# Patient Record
Sex: FEMALE | Race: WHITE | NOT HISPANIC OR LATINO | URBAN - METROPOLITAN AREA
[De-identification: names, ages, dates, MRNs, and addresses within clinical notes are randomized per-mention and may not be internally consistent; named-entity substitution may affect disease eponyms.]

---

## 2017-01-16 ENCOUNTER — TRANSFERRED RECORDS (OUTPATIENT)
Dept: FAMILY MEDICINE | Facility: CLINIC | Age: 37
End: 2017-01-16

## 2017-01-16 ENCOUNTER — OFFICE VISIT (OUTPATIENT)
Dept: FAMILY MEDICINE | Facility: CLINIC | Age: 37
End: 2017-01-16

## 2017-01-16 VITALS
WEIGHT: 200 LBS | DIASTOLIC BLOOD PRESSURE: 76 MMHG | SYSTOLIC BLOOD PRESSURE: 116 MMHG | BODY MASS INDEX: 35.44 KG/M2 | TEMPERATURE: 98.7 F | HEIGHT: 63 IN | HEART RATE: 100 BPM | OXYGEN SATURATION: 98 %

## 2017-01-16 DIAGNOSIS — M77.01 MEDIAL EPICONDYLITIS OF RIGHT ELBOW: Primary | ICD-10-CM

## 2017-01-16 DIAGNOSIS — Z23 NEED FOR DIPHTHERIA-TETANUS-PERTUSSIS (TDAP) VACCINE, ADULT/ADOLESCENT: ICD-10-CM

## 2017-01-16 DIAGNOSIS — L68.0 HIRSUTISM: ICD-10-CM

## 2017-01-16 DIAGNOSIS — E66.09 NON MORBID OBESITY DUE TO EXCESS CALORIES: ICD-10-CM

## 2017-01-16 DIAGNOSIS — Z00.00 ROUTINE GENERAL MEDICAL EXAMINATION AT A HEALTH CARE FACILITY: ICD-10-CM

## 2017-01-16 PROCEDURE — 36415 COLL VENOUS BLD VENIPUNCTURE: CPT | Performed by: FAMILY MEDICINE

## 2017-01-16 PROCEDURE — 99395 PREV VISIT EST AGE 18-39: CPT | Performed by: FAMILY MEDICINE

## 2017-01-16 PROCEDURE — 99214 OFFICE O/P EST MOD 30 MIN: CPT | Mod: 25 | Performed by: FAMILY MEDICINE

## 2017-01-16 NOTE — PROGRESS NOTES
SUBJECTIVE:     CC: Jackeline Yeboah is an 36 year old woman who presents for preventive health visit.     Healthy Habits:    Do you get at least three servings of calcium containing foods daily (dairy, green leafy vegetables, etc.)? yes    Amount of exercise or daily activities, outside of work: pt bikes everywhere, so regular daily exercise    Problems taking medications regularly No    Medication side effects: No    Have you had an eye exam in the past two years? yes    Do you see a dentist twice per year? Once per year    Do you have sleep apnea, excessive snoring or daytime drowsiness?yes, has CPAP        Pt still has ongoing chronic cough but better since on Singulair. She has since stopped her flovent because it made her throat burn. She needs to try another inhaled steroid.     She complains of right elbow pain. It gets better if she avoids any weight bearing activity.     Today's PHQ-2 Score:   PHQ-2 ( 1999 Pfizer) 1/16/2017 6/30/2015   Q1: Little interest or pleasure in doing things 2 2   Q2: Feeling down, depressed or hopeless 2 2   PHQ-2 Score 4 4     Pt sees therapist to deal with her depression/meds    Abuse: Current or Past(Physical, Sexual or Emotional)- No  Do you feel safe in your environment - Yes    Social History   Substance Use Topics     Smoking status: Never Smoker      Smokeless tobacco: Never Used     Alcohol Use: No     The pt does not drink alcohol    Recent Labs   Lab Test  06/30/15   1422   CHOL  149   HDL  48   LDL  88   TRIG  66       Reviewed orders with patient.  Reviewed health maintenance and updated orders accordingly - Yes    Mammo Decision Support:  Mammogram not appropriate for this patient based on age.    Pertinent mammograms are reviewed under the imaging tab.  History of abnormal Pap smear: NO - age 30- 65 PAP every 3 years recommended  All Histories reviewed and updated in Epic.  History reviewed. No pertinent past medical history.   Past Surgical History   Procedure  Laterality Date     C lap ovarian cystotomy  2007     Obstetric History     No data available          ROS:  C: NEGATIVE for fever, chills, change in weight  I: NEGATIVE for worrisome rashes, moles or lesions  E: NEGATIVE for vision changes or irritation  ENT: allergy related congestion, better since starting loratadine  RESP:cough, see above  B: NEGATIVE for masses, tenderness or discharge  CV: NEGATIVE for chest pain, palpitations or peripheral edema  GI: NEGATIVE for nausea, abdominal pain, heartburn, or change in bowel habits  : NEGATIVE for unusual urinary or vaginal symptoms. Periods are regular.  M: NEGATIVE for significant arthralgias or myalgia  N: NEGATIVE for weakness, dizziness or paresthesias  P: NEGATIVE for changes in mood or affect    Problem list, Medication list, Allergies, and Medical/Social/Surgical histories reviewed in [facility] and updated as appropriate.  Labs reviewed in EPIC  BP Readings from Last 3 Encounters:   01/16/17 116/76   12/30/16 108/70   12/05/16 102/70    Wt Readings from Last 3 Encounters:   01/16/17 90.719 kg (200 lb)   12/30/16 91.627 kg (202 lb)   12/05/16 89.812 kg (198 lb)                  Patient Active Problem List   Diagnosis     Anxiety     Depression     Obstructive sleep apnea syndrome     Past Surgical History   Procedure Laterality Date     C lap ovarian cystotomy  2007       Social History   Substance Use Topics     Smoking status: Never Smoker      Smokeless tobacco: Never Used     Alcohol Use: No     Family History   Problem Relation Age of Onset     Hypertension Mother      Hypertension Father      Hyperlipidemia Mother      Hyperlipidemia Father      DIABETES Mother 40     Prostate Cancer Maternal Grandmother 50     lukemia     Prostate Cancer Paternal Grandmother 65     stomack Cancer     Depression Mother      Asthma Mother      Asthma Father      Asthma Brother      Obesity Mother 40         Current Outpatient Prescriptions   Medication Sig Dispense Refill      "montelukast (SINGULAIR) 10 MG tablet Take 1 tablet (10 mg) by mouth At Bedtime 30 tablet 1     guaiFENesin-codeine (ROBITUSSIN AC) 100-10 MG/5ML SOLN solution Take 5-10 mLs by mouth every 4 hours as needed for cough 180 mL 0     loratadine (CLARITIN) 10 MG tablet Take 10 mg by mouth daily       IBUPROFEN PO Take 200 mg by mouth as needed for moderate pain or fever       ACETAMINOPHEN PO Take 325 mg by mouth as needed for pain or fever       albuterol (PROAIR HFA, PROVENTIL HFA, VENTOLIN HFA) 108 (90 BASE) MCG/ACT inhaler Inhale 2 puffs into the lungs every 6 hours 1 Inhaler 2     Allergies   Allergen Reactions     Apple Shortness Of Breath     Red apples     Corn Syrup [Glucose] Shortness Of Breath     Grass Shortness Of Breath     No Clinical Screening - See Comments Rash     Sun - skin reaction     Dust Mite Extract Difficulty breathing     Milk Protein Extract Other (See Comments) and GI Disturbance     Joint pain     Trees Difficulty breathing     Dogs Rash     Latex Rash     Peanuts [Nuts] Rash     OBJECTIVE:     /76 mmHg  Pulse 100  Temp(Src) 98.7  F (37.1  C) (Oral)  Ht 1.6 m (5' 3\")  Wt 90.719 kg (200 lb)  BMI 35.44 kg/m2  SpO2 98%  LMP 01/03/2017  EXAM:  GENERAL: healthy, alert and no distress  EYES: Eyes grossly normal to inspection, PERRL and conjunctivae and sclerae normal  HENT: ear canals and TM's normal, nose and mouth without ulcers or lesions  NECK: no adenopathy, no asymmetry, masses, or scars and thyroid normal to palpation  RESP: lungs clear to auscultation - no rales, rhonchi or wheezes  BREAST: normal without masses, tenderness or nipple discharge and no palpable axillary masses or adenopathy  CV: regular rate and rhythm, normal S1 S2, no S3 or S4, no murmur, click or rub, no peripheral edema and peripheral pulses strong  ABDOMEN: soft, nontender, no hepatosplenomegaly, no masses and bowel sounds normal   (female): normal female external genitalia, normal urethral meatus, vaginal " "mucosa pink, moist, well rugated, and normal cervix/adnexa/uterus without masses or discharge  MS: no gross musculoskeletal defects noted, no edema  SKIN: extreme hirsute hair growth throughout the body with Ferriman-Gallwey score of 28  NEURO: Normal strength and tone, mentation intact and speech normal  PSYCH: mentation appears normal, affect normal/bright    ASSESSMENT/PLAN:     Jackeline was seen today for physical and cough.    Diagnoses and all orders for this visit:    Medial epicondylitis of right elbow  Recommend RICE and forearm band is given.    Routine general medical examination at a health care facility  -     BSE reviewed, pap will be done next year, encouraged regular exercise.    Hirsutism  -     Testosterone Free and Total ([facility])  -     Dihydrotestosterone ([facility])  -     TSH ([facility])  -     Thyroxine (T4) Free  Direct  S ([facility])  -     Prolactin ([facility])  -     17-OH Progesterone  2 Spec ([facility])  -     VENOUS COLLECTION  Refer to Endocrinology for treatment options.    Non morbid obesity due to excess calories  -     Lipid Panel ([facility])  -     Hemoglobin A1C ([facility])  -     Comp. Metabolic Panel (14) ([facility])  -     VENOUS COLLECTION    Need for diphtheria-tetanus-pertussis (Tdap) vaccine, adult/adolescent  -     Cancel: TDAP (ADACEL AGES 11-64)  -     VENOUS COLLECTION        COUNSELING:   Reviewed preventive health counseling, as reflected in patient instructions       Regular exercise       Healthy diet/nutrition       Family planning       Safe sex practices/STD prevention         reports that she has never smoked. She has never used smokeless tobacco.    Estimated body mass index is 35.44 kg/(m^2) as calculated from the following:    Height as of this encounter: 1.6 m (5' 3\").    Weight as of this encounter: 90.719 kg (200 lb).   Weight management plan: Discussed healthy diet and exercise guidelines and patient will follow up in 12 months in clinic to re-evaluate.    Counseling " Resources:  ATP IV Guidelines  Pooled Cohorts Equation Calculator  Breast Cancer Risk Calculator  FRAX Risk Assessment  ICSI Preventive Guidelines  Dietary Guidelines for Americans, 2010  USDA's MyPlate  ASA Prophylaxis  Lung CA Screening    Crystal Hilton MD  [facility]

## 2017-01-16 NOTE — Clinical Note
[facility] [address] [place]  [address]  Phone: [phone]    January 20, 2017      Jackeline Yeboah  [address] [facility] [address]              Dear Jackeline,    I am covering for  while she is out of town.   Your included test results are within normal ranges. I do not suggest that we make any changes at this time.      Sincerely,     Caesar Gupta M.D. For Dr Hilton    Results for orders placed or performed in visit on 01/16/17   Hemoglobin A1C ([facility])   Result Value Ref Range    Hemoglobin A1C 5.6 4.8 - 5.6 %    Narrative    Performed at:  01 - [facility] [address], [place], [place]  [id number]  : Quang Rocha MD, Phone:  [phone]

## 2017-01-18 LAB — HBA1C MFR BLD: 5.6 % (ref 4.8–5.6)

## 2017-01-20 LAB
ALBUMIN SERPL-MCNC: 4.4 G/DL (ref 3.5–5.5)
ALBUMIN/GLOB SERPL: 1.7 {RATIO} (ref 1.1–2.5)
ALP SERPL-CCNC: 76 IU/L (ref 39–117)
ALT SERPL-CCNC: 20 IU/L (ref 0–32)
AST SERPL-CCNC: 13 IU/L (ref 0–40)
BILIRUB SERPL-MCNC: 0.3 MG/DL (ref 0–1.2)
BUN SERPL-MCNC: 12 MG/DL (ref 6–20)
BUN/CREATININE RATIO: 16 (ref 8–20)
CALCIUM SERPL-MCNC: 9.4 MG/DL (ref 8.7–10.2)
CHLORIDE SERPLBLD-SCNC: 100 MMOL/L (ref 96–106)
CHOLEST SERPL-MCNC: 159 MG/DL (ref 100–199)
CREAT SERPL-MCNC: 0.76 MG/DL (ref 0.57–1)
DIHYDROTESTOSTERONE: 7.5 NG/DL
EGFR IF AFRICN AM: 118 ML/MIN/1.73
EGFR IF NONAFRICN AM: 102 ML/MIN/1.73
FREE TESTOSTERONE(DIRECT): 2.5 PG/ML (ref 0–4.2)
GLOBULIN, TOTAL: 2.6 G/DL (ref 1.5–4.5)
GLUCOSE SERPL-MCNC: 91 MG/DL (ref 65–99)
HDLC SERPL-MCNC: 45 MG/DL
LDL/HDL RATIO: 2.2 RATIO UNITS (ref 0–3.2)
LDLC SERPL CALC-MCNC: 100 MG/DL (ref 0–99)
POTASSIUM SERPL-SCNC: 4.3 MMOL/L (ref 3.5–5.2)
PROLACTIN: 24.6 NG/ML (ref 4.8–23.3)
PROT SERPL-MCNC: 7 G/DL (ref 6–8.5)
SODIUM SERPL-SCNC: 138 MMOL/L (ref 134–144)
T4 FREE SERPL-MCNC: 1.37 NG/DL (ref 0.82–1.77)
TESTOST SERPL-MCNC: 31 NG/DL (ref 8–48)
TOTAL CO2: 23 MMOL/L (ref 18–28)
TRIGL SERPL-MCNC: 68 MG/DL (ref 0–149)
TSH BLD-ACNC: 4.4 UIU/ML (ref 0.45–4.5)
VLDLC SERPL CALC-MCNC: 14 MG/DL (ref 5–40)

## 2017-01-20 NOTE — PROGRESS NOTES
Quick Note:    Dear Jackeline,   I am covering for  while she is out of town. Your included test results are within normal ranges. I do not suggest that we make any changes at this time.    Caesar Gupta M.D.    ______

## 2017-01-20 NOTE — PROGRESS NOTES
Quick Note:    Results mailed to patient today  Rosa Elena Whitley MA   I am covering for  while she is out of town. Your included test results are within normal ranges. I do not suggest that we make any changes at this time.  ______

## 2017-01-25 ENCOUNTER — TELEPHONE (OUTPATIENT)
Dept: FAMILY MEDICINE | Facility: CLINIC | Age: 37
End: 2017-01-25

## 2017-01-25 DIAGNOSIS — R79.89 ELEVATED PROLACTIN LEVEL: Primary | ICD-10-CM

## 2017-01-25 DIAGNOSIS — L68.0 HIRSUTISM: ICD-10-CM

## 2017-01-25 NOTE — TELEPHONE ENCOUNTER
Called patient to discuss results. Patient had seen Dr. Hilton's Continuentt message so was aware and agrees to plan.   She will RTC of repeat Prolactin level 1/27/17 and we will send referral to [facility] for consult regarding Hirsutism. If second prolactin level elevated, understands will need MRI of brain and will plan to get this all done prior to endocrine consult - expect appt with endocrine to be a few weeks out.   Patient agrees and no further questions. Future order in [facility] for prolactin.   Referral, Dr. Hilton's last visit note, labs for past 2 years faxed to endocrine. Noted on referral/labs that second prolactin level will be done 1/27/17. We should fax this level to endocrine also at [phone].  Maite Palomino RN

## 2017-01-25 NOTE — TELEPHONE ENCOUNTER
----- Message from Crystal Hilton MD sent at 1/23/2017  3:37 PM CST -----  Prolactin is mildly increased. This should be repeated to verify if it is real. If it remains mildly or more elevated, we will need to do MRI of the brain to exclude pituitary tumor (typically benign). I would also like Jackeline to see endocrinology to guide further work up of her Hirsutism.

## 2017-01-27 DIAGNOSIS — R79.89 ELEVATED PROLACTIN LEVEL: ICD-10-CM

## 2017-01-27 DIAGNOSIS — L68.0 HIRSUTISM: ICD-10-CM

## 2017-01-27 LAB
Lab: 51 NG/DL
Lab: NORMAL
Lab: NORMAL NG/DL
TUBE ID #1: NORMAL

## 2017-01-27 PROCEDURE — 36415 COLL VENOUS BLD VENIPUNCTURE: CPT | Performed by: FAMILY MEDICINE

## 2017-01-28 LAB — PROLACTIN: 51.3 NG/ML (ref 4.8–23.3)

## 2017-01-30 ENCOUNTER — TELEPHONE (OUTPATIENT)
Dept: FAMILY MEDICINE | Facility: CLINIC | Age: 37
End: 2017-01-30

## 2017-01-30 DIAGNOSIS — R79.89 ELEVATED PROLACTIN LEVEL: Primary | ICD-10-CM

## 2017-01-30 NOTE — TELEPHONE ENCOUNTER
Called patient with lab-prolactin result.  Informed  her Prolactin is elevated and she needs MRI of brain to evaluate pituitary.  Order sent to [facility] to call patient to schedule.  Patient has appointment with sophie on Friday Feb 3rd.

## 2017-01-31 NOTE — PROGRESS NOTES
1/30/17 faxed 1/16/17, 12/30/16, 12/5/16, 1/28/16 and 6/30/15 office notes and 1/16/17 to 6/30/15 lab results to Endocrinology clinic of \[facility]", Atten: Reji @ [phone]    Kenton Downing,   [facility]  [phone]

## 2017-02-01 ENCOUNTER — TRANSFERRED RECORDS (OUTPATIENT)
Dept: FAMILY MEDICINE | Facility: CLINIC | Age: 37
End: 2017-02-01

## 2017-02-03 LAB — TSH SERPL-ACNC: 2.8 UIU/ML (ref 0.3–5)

## 2017-02-04 ENCOUNTER — TRANSFERRED RECORDS (OUTPATIENT)
Dept: FAMILY MEDICINE | Facility: CLINIC | Age: 37
End: 2017-02-04

## 2017-04-13 ENCOUNTER — TELEPHONE (OUTPATIENT)
Dept: FAMILY MEDICINE | Facility: CLINIC | Age: 37
End: 2017-04-13

## 2017-04-13 DIAGNOSIS — G47.33 OBSTRUCTIVE SLEEP APNEA SYNDROME: Primary | ICD-10-CM

## 2018-03-29 DIAGNOSIS — B35.4 TINEA CORPORIS: ICD-10-CM

## 2018-03-29 RX ORDER — NYSTATIN 100000 U/G
CREAM TOPICAL 3 TIMES DAILY
Qty: 30 G | Refills: 1 | OUTPATIENT
Start: 2018-03-29

## 2018-04-03 ENCOUNTER — TELEPHONE (OUTPATIENT)
Dept: FAMILY MEDICINE | Facility: CLINIC | Age: 38
End: 2018-04-03

## 2018-04-03 DIAGNOSIS — B35.4 TINEA CORPORIS: ICD-10-CM

## 2018-04-03 RX ORDER — NYSTATIN 100000 U/G
CREAM TOPICAL
Qty: 30 G | Refills: 0 | Status: SHIPPED | OUTPATIENT
Start: 2018-04-03 | End: 2021-01-14

## 2018-04-03 NOTE — TELEPHONE ENCOUNTER
----- Message from Crystal Hilton MD sent at 4/3/2018  3:08 PM CDT -----  Regarding: RE: rash - nystatin cream request.   Ok to refill Nystatin.  Thanks    ----- Message -----     From: Maite Palomino RN     Sent: 4/2/2018   1:49 PM       To: Crystal Hilton MD  Subject: rash - nystatin cream request.                   Dr. Hilton - patient requesting refill on nystatin cream got from Dr. Ashby 1/2016 for rash in her axillae -- tinea corporis.   She has cpx scheduled with you 4/25/18.   States she has had symptoms again x couple months - using OTC lotrimin for past several weeks and helps some but not resolving.   Describes as irritated bumpy red rash. Does not shave in this area due to the rash. Can't use antifungal powder because bothers her asthma.   Asks if you might order the nystatin cream for her to use and hopefully clear up before her 4/25 cpx. Or do you need to see her?  Maite

## 2018-04-03 NOTE — TELEPHONE ENCOUNTER
Patient informed Dr. Hilton approved rx for nystatin cream. Rx sent to pharmacy.  Maite Palomino RN

## 2018-04-25 ENCOUNTER — OFFICE VISIT (OUTPATIENT)
Dept: FAMILY MEDICINE | Facility: CLINIC | Age: 38
End: 2018-04-25

## 2018-04-25 VITALS
DIASTOLIC BLOOD PRESSURE: 76 MMHG | HEIGHT: 63 IN | SYSTOLIC BLOOD PRESSURE: 122 MMHG | HEART RATE: 94 BPM | BODY MASS INDEX: 34.02 KG/M2 | WEIGHT: 192 LBS

## 2018-04-25 DIAGNOSIS — E66.09 OBESITY DUE TO EXCESS CALORIES WITHOUT SERIOUS COMORBIDITY, UNSPECIFIED CLASSIFICATION: ICD-10-CM

## 2018-04-25 DIAGNOSIS — Z00.00 ROUTINE GENERAL MEDICAL EXAMINATION AT A HEALTH CARE FACILITY: Primary | ICD-10-CM

## 2018-04-25 DIAGNOSIS — E55.9 VITAMIN D DEFICIENCY: ICD-10-CM

## 2018-04-25 DIAGNOSIS — L68.0 HIRSUTISM: ICD-10-CM

## 2018-04-25 DIAGNOSIS — J45.20 MILD INTERMITTENT ASTHMA WITHOUT COMPLICATION: ICD-10-CM

## 2018-04-25 DIAGNOSIS — E22.1 HYPERPROLACTINEMIA (H): ICD-10-CM

## 2018-04-25 DIAGNOSIS — D35.2 PITUITARY ADENOMA (H): ICD-10-CM

## 2018-04-25 DIAGNOSIS — L73.9 FOLLICULITIS: ICD-10-CM

## 2018-04-25 PROCEDURE — G0439 PPPS, SUBSEQ VISIT: HCPCS | Performed by: FAMILY MEDICINE

## 2018-04-25 PROCEDURE — 99214 OFFICE O/P EST MOD 30 MIN: CPT | Mod: 25 | Performed by: FAMILY MEDICINE

## 2018-04-25 RX ORDER — ALBUTEROL SULFATE 90 UG/1
2 AEROSOL, METERED RESPIRATORY (INHALATION) EVERY 6 HOURS
Qty: 1 INHALER | Refills: 2 | Status: SHIPPED | OUTPATIENT
Start: 2018-04-25 | End: 2021-01-14

## 2018-04-25 RX ORDER — CEPHALEXIN 500 MG/1
500 CAPSULE ORAL 3 TIMES DAILY
Qty: 30 CAPSULE | Refills: 0 | Status: SHIPPED | OUTPATIENT
Start: 2018-04-25 | End: 2018-06-25

## 2018-04-25 NOTE — PROGRESS NOTES
SUBJECTIVE:   CC: Jacekline Yeboah is an 38 year old woman who presents for preventive health visit.     Issues to add on today:  1. She has swollen lymph nodes, especially in her armpits. She feels like she has a full body yeast infection. She notes that it is typically intermittent, but for the past few days has been really bad. It hurts due to the swelling. She has been using a Nystatin cream on the affected areas.  2. Pituitary adenoma/hyperpituitarism: referred to Endocrinology last year, supposed to follow up every 3 months for labs, only did once. She did not follow up with [facility] as recommended.  3. Asthma: stable with only prn use of albuterol.   4. Vitamin D deficiency: taking supplement, uncertain of dose, never had level rechecked.       Healthy Habits:    Do you get at least three servings of calcium containing foods daily (dairy, green leafy vegetables, etc.)? no, taking calcium and/or vitamin D supplement: no    Amount of exercise or daily activities, outside of work: n/a    Problems taking medications regularly No    Medication side effects: No    Have you had an eye exam in the past two years? no    Do you see a dentist twice per year? No not since 12    Do you have sleep apnea, excessive snoring or daytime drowsiness?yes      PROBLEMS TO ADD ON...yearly MRI per Endocrinology, due for labs, and Vitamin D deficiency    Today's PHQ-2 Score:   PHQ-2 ( 1999 Pfizer) 4/25/2018 1/16/2017   Q1: Little interest or pleasure in doing things 0 2   Q2: Feeling down, depressed or hopeless 0 2   PHQ-2 Score 0 4       Abuse: Current or Past(Physical, Sexual or Emotional)- No  Do you feel safe in your environment - Yes    Social History   Substance Use Topics     Smoking status: Never Smoker     Smokeless tobacco: Never Used     Alcohol use No     If you drink alcohol do you typically have >3 drinks per day or >7 drinks per week? No                     Reviewed orders with patient.  Reviewed health maintenance  and updated orders accordingly - Yes  Labs reviewed in EPIC  BP Readings from Last 3 Encounters:   04/25/18 122/76   01/16/17 116/76   12/30/16 108/70    Wt Readings from Last 3 Encounters:   04/25/18 87.1 kg (192 lb)   01/16/17 90.7 kg (200 lb)   12/30/16 91.6 kg (202 lb)                  Patient Active Problem List   Diagnosis     Anxiety     Depression     Obstructive sleep apnea syndrome     YONATAN on CPAP     Obesity due to excess calories without serious comorbidity, unspecified classification     Hirsutism     Pituitary adenoma (H)     Hyperprolactinemia (H)     Past Surgical History:   Procedure Laterality Date     C LAP OVARIAN CYSTOTOMY  2007       Social History   Substance Use Topics     Smoking status: Never Smoker     Smokeless tobacco: Never Used     Alcohol use No     Family History   Problem Relation Age of Onset     Hypertension Mother      Hypertension Father      Hyperlipidemia Mother      Hyperlipidemia Father      DIABETES Mother 40     Prostate Cancer Maternal Grandmother 50     lukemia     Prostate Cancer Paternal Grandmother 65     stomack Cancer     Depression Mother      Asthma Mother      Asthma Father      Asthma Brother      Obesity Mother 40         Current Outpatient Prescriptions   Medication Sig Dispense Refill     ACETAMINOPHEN PO Take 325 mg by mouth as needed for pain or fever       albuterol (PROAIR HFA/PROVENTIL HFA/VENTOLIN HFA) 108 (90 Base) MCG/ACT Inhaler Inhale 2 puffs into the lungs every 6 hours 1 Inhaler 2     cephALEXin (KEFLEX) 500 MG capsule Take 1 capsule (500 mg) by mouth 3 times daily 30 capsule 0     Elastic Bandages & Supports (BAND-IT FOREARM BAND) MISC Use on right forearm for the treatment of epicondylitis 1 each 0     IBUPROFEN PO Take 200 mg by mouth as needed for moderate pain or fever       loratadine (CLARITIN) 10 MG tablet Take 10 mg by mouth daily       nystatin (MYCOSTATIN) cream Apply topically to axillary rash TID prn. See MD if no resolution of rash.  30 g 0     Allergies   Allergen Reactions     Apple Shortness Of Breath     Red apples     Corn Syrup [Glucose] Shortness Of Breath     Grass Shortness Of Breath     No Clinical Screening - See Comments Rash     Sun - skin reaction     Dust Mite Extract Difficulty breathing     Milk Protein Extract Other (See Comments) and GI Disturbance     Joint pain     Trees Difficulty breathing     Dogs Rash     Latex Rash     Peanuts [Nuts] Rash     Recent Labs   Lab Test  04/25/18   1458  02/03/17   1502  01/16/17   1155  06/30/15   1422   A1C   --    --   5.6   --    LDL  75   --   100*  88   HDL  38*   --   45  48   TRIG  67   --   68  66   ALT   --    --   20  11   CR   --    --   0.76  0.74   POTASSIUM   --    --   4.3  4.2   TSH   --   2.80   --    --         Mammogram not appropriate for this patient based on age.    Pertinent mammograms are reviewed under the imaging tab.  History of abnormal Pap smear: NO - age 30- 65 PAP every 3 years recommended    Reviewed and updated as needed this visit by clinical staff  Tobacco  Allergies  Meds  Med Hx  Surg Hx  Fam Hx  Soc Hx        Reviewed and updated as needed this visit by Provider        History reviewed. No pertinent past medical history.   Past Surgical History:   Procedure Laterality Date     C LAP OVARIAN CYSTOTOMY  2007     Obstetric History     No data available          ROS: (except as noted above)  CONSTITUTIONAL: NEGATIVE for fever, chills, change in weight  INTEGUMENTARU/SKIN: NEGATIVE for worrisome rashes, moles or lesions  EYES: NEGATIVE for vision changes or irritation  ENT: NEGATIVE for ear, mouth and throat problems  RESP: NEGATIVE for significant cough or SOB  BREAST: NEGATIVE for masses, tenderness or discharge  CV: NEGATIVE for chest pain, palpitations or peripheral edema  GI: NEGATIVE for nausea, abdominal pain, heartburn, or change in bowel habits  : NEGATIVE for unusual urinary or vaginal symptoms. Periods are regular.  MUSCULOSKELETAL:  "NEGATIVE for significant arthralgias or myalgia  NEURO: NEGATIVE for weakness, dizziness or paresthesias  PSYCHIATRIC: NEGATIVE for changes in mood or affect    OBJECTIVE:   /76  Pulse 94  Ht 1.6 m (5' 3\")  Wt 87.1 kg (192 lb)  LMP 04/23/2018 (Exact Date)  Breastfeeding? No  BMI 34.01 kg/m2  EXAM:  GENERAL: healthy, alert and no distress  EYES: Eyes grossly normal to inspection, PERRL and conjunctivae and sclerae normal  HENT: ear canals and TM's normal, nose and mouth without ulcers or lesions  NECK: no adenopathy, no asymmetry, masses, or scars and thyroid normal to palpation  RESP: lungs clear to auscultation - no rales, rhonchi or wheezes  BREAST: normal without masses, tenderness or nipple discharge and no palpable axillary masses or adenopathy  CV: regular rate and rhythm, normal S1 S2, no S3 or S4, no murmur, click or rub, no peripheral edema and peripheral pulses strong  ABDOMEN: soft, nontender, no hepatosplenomegaly, no masses and bowel sounds normal   (female): normal female external genitalia, normal urethral meatus, vaginal mucosa pink, moist, well rugated, and normal cervix/adnexa/uterus without masses or discharge  MS: no gross musculoskeletal defects noted, no edema  SKIN: axillary follicle hypertrophy and areas of redness/inflammation, hirsiut hair growth seen on face, chest, abdomen, low back, thighs  NEURO: Normal strength and tone, mentation intact and speech normal  PSYCH: mentation appears normal, affect normal/bright    ASSESSMENT/PLAN:   Jackeline was seen today for physical and refill request. She is a healthy appearing 38 year old female with known medical problems. Td booster is appropriate, but missed at her visit today. She will return for this with her next blood draw. BSE reviewed. She will establish baseline mammogram next year.    Diagnoses and all orders for this visit:    Hyperprolactinemia (H)  -     Prolactin ([facility]), recheck today and follow up with " "endocrinology.    Mild intermittent Asthma without complication  -     albuterol (PROAIR HFA/PROVENTIL HFA/VENTOLIN HFA) 108 (90 Base) MCG/ACT Inhaler; Inhale 2 puffs into the lungs every 6 hours  Continue as needed, check expiration date to make sure always have fresh inhaler on hand.    Pituitary adenoma (H)/Hirsutism  -     Prolactin ([facility])  -     TSH ([facility])  -     Referral to [facility]  Referral to endocrinologist to manage this is given.    Obesity due to excess calories without serious comorbidity, unspecified classification  -     Glucose  Serum ([facility])  -     Lipid Panel ([facility])  Discussed lifestyle modification including exercise and proper healthy eating. Co-morbidities associated with obesity discussed.    Vitamin D deficiency  -     Vitamin D  25-Hydroxy ([facility])  Need to verify adequate replacement dose.    Folliculitis  -     cephALEXin (KEFLEX) 500 MG capsule; Take 1 capsule (500 mg) by mouth 3 times daily  Hot pack to any areas that worsen, or follow up if failure to resolve.      COUNSELING:   Reviewed preventive health counseling, as reflected in patient instructions       Regular exercise       Healthy diet/nutrition       Vision screening       Hearing screening       Contraception       Osteoporosis Prevention/Bone Health         reports that she has never smoked. She has never used smokeless tobacco.    Estimated body mass index is 34.01 kg/(m^2) as calculated from the following:    Height as of this encounter: 1.6 m (5' 3\").    Weight as of this encounter: 87.1 kg (192 lb).   Weight management plan: Discussed healthy diet and exercise guidelines and patient will follow up in 6 months in clinic to re-evaluate.    Counseling Resources:  ATP IV Guidelines  Pooled Cohorts Equation Calculator  Breast Cancer Risk Calculator  FRAX Risk Assessment  ICSI Preventive Guidelines  Dietary Guidelines for Americans, 2010  USDA's MyPlate  ASA Prophylaxis  Lung CA Screening    Crystal MAY" MD Yobani  [facility]

## 2018-04-25 NOTE — MR AVS SNAPSHOT
After Visit Summary   4/25/2018    Jackeline Yeboah    MRN: [id number]           Patient Information     Date Of Birth          1980        Visit Information        Provider Department      4/25/2018 1:00 PM Crystal Hilton MD [facility]        Today's Diagnoses     Routine general medical examination at a health care facility    -  1    Hyperprolactinemia (H)        Pituitary adenoma (H)        Hirsutism        Obesity due to excess calories without serious comorbidity, unspecified classification        Vitamin D deficiency        Folliculitis        Mild intermittent asthma without complication          Care Instructions      Preventive Health Recommendations  Female Ages 26 - 39  Yearly exam:   See your health care provider every year in order to    Review health changes.     Discuss preventive care.      Review your medicines if you your doctor has prescribed any.    Until age 30: Get a Pap test every three years (more often if you have had an abnormal result).    After age 30: Talk to your doctor about whether you should have a Pap test every 3 years or have a Pap test with HPV screening every 5 years.   You do not need a Pap test if your uterus was removed (hysterectomy) and you have not had cancer.  You should be tested each year for STDs (sexually transmitted diseases), if you're at risk.   Talk to your provider about how often to have your cholesterol checked.  If you are at risk for diabetes, you should have a diabetes test (fasting glucose).  Shots: Get a flu shot each year. Get a tetanus shot every 10 years.   Nutrition:     Eat at least 5 servings of fruits and vegetables each day.    Eat whole-grain bread, whole-wheat pasta and brown rice instead of white grains and rice.    Talk to your provider about Calcium and Vitamin D.     Lifestyle    Exercise at least 150 minutes a week (30 minutes a day, 5 days of the week). This will help you control your weight and  prevent disease.    Limit alcohol to one drink per day.    No smoking.     Wear sunscreen to prevent skin cancer.    See your dentist every six months for an exam and cleaning.            Follow-ups after your visit        Additional Services     [facility]       Referral to [facility]  Phone: [phone]  Fax: [phone]  Reason for referral: MRI Noah with and without contrast                  Your next 10 appointments already scheduled     Jun 25, 2018 12:30 PM CDT   SHORT with Crystal Hilton MD   [facility]    [address] [place] [address] [phone]              Who to contact     If you have questions or need follow up information about today's clinic visit or your schedule please contact [facility] directly at [phone].  Normal or non-critical lab and imaging results will be communicated to you by epicuriohart, letter or phone within 4 business days after the clinic has received the results. If you do not hear from us within 7 days, please contact the clinic through epicuriohart or phone. If you have a critical or abnormal lab result, we will notify you by phone as soon as possible.  Submit refill requests through [employer] or call your pharmacy and they will forward the refill request to us. Please allow 3 business days for your refill to be completed.          Additional Information About Your Visit        epicurioharWhoSay Information     [employer] gives you secure access to your electronic health record. If you see a primary care provider, you can also send messages to your care team and make appointments. If you have questions, please call your primary care clinic.  If you do not have a primary care provider, please call [phone] and they will assist you.        Care EveryWhere ID     This is your Care EveryWhere ID. This could be used by other organizations to access your [place] medical records  [id number]       "  Your Vitals Were     Pulse Height Last Period Breastfeeding? BMI (Body Mass Index)       94 1.6 m (5' 3\") 04/23/2018 (Exact Date) No 34.01 kg/m2        Blood Pressure from Last 3 Encounters:   04/25/18 122/76   01/16/17 116/76   12/30/16 108/70    Weight from Last 3 Encounters:   04/25/18 87.1 kg (192 lb)   01/16/17 90.7 kg (200 lb)   12/30/16 91.6 kg (202 lb)              We Performed the Following     Glucose  Serum ([facility])     Lipid Panel ([facility])     Prolactin ([facility])     Referral to [facility]     TSH ([facility])     Vitamin D  25-Hydroxy ([facility])          Today's Medication Changes          These changes are accurate as of 4/25/18 11:59 PM.  If you have any questions, ask your nurse or doctor.               Start taking these medicines.        Dose/Directions    cephALEXin 500 MG capsule   Commonly known as:  KEFLEX   Used for:  Folliculitis   Started by:  Crystal Hilton MD        Dose:  500 mg   Take 1 capsule (500 mg) by mouth 3 times daily   Quantity:  30 capsule   Refills:  0         Stop taking these medicines if you haven't already. Please contact your care team if you have questions.     montelukast 10 MG tablet   Commonly known as:  SINGULAIR   Stopped by:  Crystal Hilton MD                Where to get your medicines      These medications were sent to [facility] [address] [place] [address] & [address], [place] [address]     Phone:  [phone]     albuterol 108 (90 Base) MCG/ACT Inhaler    cephALEXin 500 MG capsule                Primary Care Provider Office Phone # Fax #    Crystal Hilton [phone] [address] [place] [address]        Equal Access to Services     TENZIN STEVENSON AH: Roro Cano, julius luqnani, qaellynta kaalta garcía. [facility] [phone].    ATENCIÓN: Si evelin ocampo " disposición servicios gratuitos de asistencia lingüística. Queta parson [phone].    We comply with applicable federal civil rights laws and [place] laws. We do not discriminate on the basis of race, color, national origin, age, disability, sex, sexual orientation, or gender identity.            Thank you!     Thank you for choosing [facility]  for your care. Our goal is always to provide you with excellent care. Hearing back from our patients is one way we can continue to improve our services. Please take a few minutes to complete the written survey that you may receive in the mail after your visit with us. Thank you!             Your Updated Medication List - Protect others around you: Learn how to safely use, store and throw away your medicines at www.disposemymeds.org.          This list is accurate as of 4/25/18 11:59 PM.  Always use your most recent med list.                   Brand Name Dispense Instructions for use Diagnosis    ACETAMINOPHEN PO      Take 325 mg by mouth as needed for pain or fever        albuterol 108 (90 Base) MCG/ACT Inhaler    PROAIR HFA/PROVENTIL HFA/VENTOLIN HFA    1 Inhaler    Inhale 2 puffs into the lungs every 6 hours    Mild intermittent asthma without complication       BAND-IT FOREARM BAND Misc     1 each    Use on right forearm for the treatment of epicondylitis    Medial epicondylitis of right elbow       cephALEXin 500 MG capsule    KEFLEX    30 capsule    Take 1 capsule (500 mg) by mouth 3 times daily    Folliculitis       IBUPROFEN PO      Take 200 mg by mouth as needed for moderate pain or fever        loratadine 10 MG tablet    CLARITIN     Take 10 mg by mouth daily        nystatin cream    MYCOSTATIN    30 g    Apply topically to axillary rash TID prn. See MD if no resolution of rash.    Tinea corporis

## 2018-04-26 LAB
CHOLEST SERPL-MCNC: 126 MG/DL (ref 100–199)
GLUCOSE SERPL-MCNC: 82 MG/DL (ref 65–99)
HDLC SERPL-MCNC: 38 MG/DL
LDL/HDL RATIO: 2 RATIO (ref 0–3.2)
LDLC SERPL CALC-MCNC: 75 MG/DL (ref 0–99)
PROLACTIN: 26.4 NG/ML (ref 4.8–23.3)
TRIGL SERPL-MCNC: 67 MG/DL (ref 0–149)
TSH BLD-ACNC: 3.09 UIU/ML (ref 0.45–4.5)
VITAMIN D, 25-HYDROXY: 38.9 NG/ML (ref 30–100)
VLDLC SERPL CALC-MCNC: 13 MG/DL (ref 5–40)

## 2018-04-26 ASSESSMENT — ASTHMA QUESTIONNAIRES: ACT_TOTALSCORE: 23

## 2018-05-10 ENCOUNTER — TELEPHONE (OUTPATIENT)
Dept: FAMILY MEDICINE | Facility: CLINIC | Age: 38
End: 2018-05-10

## 2018-05-10 DIAGNOSIS — B37.31 YEAST INFECTION OF THE VAGINA: Primary | ICD-10-CM

## 2018-05-10 RX ORDER — FLUCONAZOLE 150 MG/1
150 TABLET ORAL ONCE
Qty: 2 TABLET | Refills: 0 | Status: SHIPPED | OUTPATIENT
Start: 2018-05-10 | End: 2018-05-10

## 2018-05-10 NOTE — TELEPHONE ENCOUNTER
Patient called requesting medication for vaginal yeast infection.  States has gotten worse since taking Keflex.  Per Dr. Hilton prescription for Diflucan sent to pharmacy.  Patient advised to RTC if symptoms do not resolve.  Stephani Gayle

## 2018-05-24 DIAGNOSIS — B37.31 YEAST INFECTION OF THE VAGINA: ICD-10-CM

## 2018-05-24 RX ORDER — FLUCONAZOLE 150 MG/1
150 TABLET ORAL ONCE
Qty: 2 TABLET | Refills: 0 | OUTPATIENT
Start: 2018-05-24 | End: 2018-05-24

## 2018-05-25 ENCOUNTER — TELEPHONE (OUTPATIENT)
Dept: FAMILY MEDICINE | Facility: CLINIC | Age: 38
End: 2018-05-25

## 2018-05-25 NOTE — TELEPHONE ENCOUNTER
Took this at the beginning of the month after using Keflex, was told to come in if not improved. We really should prove that it is yeast and not bacterial vaginosis.

## 2018-06-05 NOTE — TELEPHONE ENCOUNTER
Received another request for  fluconazole.  This was denied on 5/24/18.  Patient needs to be seen to be evaluated.  Message was left for patient regarding that on 5/25/18.  She has an appointment for 6/25/18    Kenton Downing,   [facility]  [phone]

## 2018-06-25 ENCOUNTER — OFFICE VISIT (OUTPATIENT)
Dept: FAMILY MEDICINE | Facility: CLINIC | Age: 38
End: 2018-06-25

## 2018-06-25 VITALS
DIASTOLIC BLOOD PRESSURE: 70 MMHG | WEIGHT: 190 LBS | BODY MASS INDEX: 33.66 KG/M2 | HEART RATE: 86 BPM | SYSTOLIC BLOOD PRESSURE: 110 MMHG

## 2018-06-25 DIAGNOSIS — Z12.4 SCREENING FOR MALIGNANT NEOPLASM OF CERVIX: Primary | ICD-10-CM

## 2018-06-25 DIAGNOSIS — K13.70 ORAL MUCOSAL LESION: ICD-10-CM

## 2018-06-25 DIAGNOSIS — Z11.3 SCREEN FOR STD (SEXUALLY TRANSMITTED DISEASE): ICD-10-CM

## 2018-06-25 PROCEDURE — 36415 COLL VENOUS BLD VENIPUNCTURE: CPT | Performed by: FAMILY MEDICINE

## 2018-06-25 PROCEDURE — 99213 OFFICE O/P EST LOW 20 MIN: CPT | Performed by: FAMILY MEDICINE

## 2018-06-25 NOTE — PROGRESS NOTES
"Problem(s) Oriented visit        SUBJECTIVE:                                                    Jackeline Yeboah is a 38 year old female who presents to clinic today for pap as she had menses when she was here for her annual exam. No prior abnormal paps.     She recently got out of long term relationship. She has no symptoms of STD, but would like testing for all.     She also notes a \"growth\" in the back of her throat for about 6 months. She feels that it makes her cough occasionally. It is sometimes easy to see, other times she is unable to see it. No fever, no sore throat. She is able to swallow fine.       Problem list, Medication list, Allergies, and Medical/Social/Surgical histories reviewed in [facility] and updated as appropriate.   Additional history: as documented    ROS:  5 point ROS completed and negative except noted above, including Gen, CV, Resp, GI, MS      Histories:   Patient Active Problem List   Diagnosis     Anxiety     Depression     Obstructive sleep apnea syndrome     YONATAN on CPAP     Obesity due to excess calories without serious comorbidity, unspecified classification     Hirsutism     Pituitary adenoma (H)     Hyperprolactinemia (H)     Past Surgical History:   Procedure Laterality Date     C LAP OVARIAN CYSTOTOMY  2007       Social History   Substance Use Topics     Smoking status: Never Smoker     Smokeless tobacco: Never Used     Alcohol use No     Family History   Problem Relation Age of Onset     Hypertension Mother      Hypertension Father      Hyperlipidemia Mother      Hyperlipidemia Father      Diabetes Mother 40     Prostate Cancer Maternal Grandmother 50     lukemia     Prostate Cancer Paternal Grandmother 65     stomack Cancer     Depression Mother      Asthma Mother      Asthma Father      Asthma Brother      Obesity Mother 40           OBJECTIVE:                                                    /70  Pulse 86  Wt 86.2 kg (190 lb)  LMP 06/18/2018 (Exact Date)  " Breastfeeding? No  BMI 33.66 kg/m2  Body mass index is 33.66 kg/(m^2).   GENERAL APPEARANCE: Alert, no acute distress  EYES: PERRL, EOM normal, conjunctiva and lids normal  HENT: oropharynx exam reveals mucosal abnormality in the left arch between the uvula and tonsil measuring about 4 mm in diameter, non-inflamed, no ulceration  NECK: No adenopathy,masses or thyromegaly   (female): normal external genitalia, moist vagina mucosa, cervix normal, adnexa without tenderness or mass  NEURO: Alert, oriented, speech and mentation normal  PSYCH: mentation appears normal, affect and mood normal   Labs Resulted Today:   Results for orders placed or performed in visit on 04/25/18   Prolactin ([facility])   Result Value Ref Range    Prolactin 26.4 (H) 4.8 - 23.3 ng/mL    Narrative    Performed at:  01 - [facility] [address], [place], [place]  [id number]  : Quang Rocha MD, Phone:  [phone]   TSH ([facility])   Result Value Ref Range    TSH 3.090 0.450 - 4.500 uIU/mL    Narrative    Performed at:  01 - [facility] [address], [place], [place]  [id number]  : Quang Rocha MD, Phone:  [phone]   Vitamin D  25-Hydroxy ([facility])   Result Value Ref Range    Vitamin D,25-Hydroxy 38.9 30.0 - 100.0 ng/mL    Narrative    Performed at:  01 - [facility] [address], [place], [place]  [id number]  : Quang Rocha MD, Phone:  [phone]   Glucose  Serum ([facility])   Result Value Ref Range    Glucose 82 65 - 99 mg/dL    Narrative    Performed at:  01 - [facility] [address], [place], [place]  [id number]  : Quang Rocha MD, Phone:  [phone]   Lipid Panel ([facility])   Result Value Ref Range    Cholesterol 126 100 - 199 mg/dL    Triglycerides 67 0 - 149 mg/dL    HDL Cholesterol 38 (L) >39 mg/dL    VLDL Cholesterol Alex 13 5 - 40 mg/dL    LDL Cholesterol Calculated 75 0 - 99 mg/dL    LDL/HDL Ratio 2.0 0.0 - 3.2 ratio    Narrative    Performed at:  01 - [facility]  [place]  [address] [place], [place]  [id number]  : Quang Rocha MD, Phone:  [phone]     ASSESSMENT/PLAN:                                                        Jackeline was seen today for gyn exam and throat problem.    Diagnoses and all orders for this visit:    Screening for malignant neoplasm of cervix  -     Pap Lb  Ct-Ng  rfx HPV ASCU ([facility]), follow up pending results.    Oral mucosal lesion  -     Referral to [facility]  Uncertain etiology, possible HPV related.     Screen for STD (sexually transmitted disease)  -     HIV 1/0/2 Rflx ([facility])  -     Rapid Plasma Reagin  Quant ([facility])  -     HBsAg Screen ([facility])  Safe sexual practices discussed.          The following health maintenance items are reviewed in Epic and correct as of today:  Health Maintenance   Topic Date Due     ASTHMA ACTION PLAN Q1 YR  03/27/1986     HIV SCREEN (SYSTEM ASSIGNED)  03/27/1999     TETANUS IMMUNIZATION (SYSTEM ASSIGNED)  10/04/2009     PAP SCREENING Q3 YR (SYSTEM ASSIGNED)  06/30/2018     INFLUENZA VACCINE (Season Ended) 09/01/2018     ASTHMA CONTROL TEST Q6 MOS  10/25/2018       Crystal Hilton MD  [facility]  Family Practice  [facility]  [phone]    For any issues my office # is [phone]

## 2018-06-25 NOTE — MR AVS SNAPSHOT
After Visit Summary   6/25/2018    Jackeline Yeboah    MRN: [id number]           Patient Information     Date Of Birth          1980        Visit Information        Provider Department      6/25/2018 1:00 PM Crystal Hilton MD [facility]        Today's Diagnoses     Screening for malignant neoplasm of cervix    -  1    Oral mucosal lesion        Screen for STD (sexually transmitted disease)           Follow-ups after your visit        Additional Services     Referral to [facility]       Referral to [facility]  Phone:  [phone]  Reason for Referral:  Oral mucosal lesion   Dr. Huey Worthington    Please be aware that coverage of these services is subject to the terms and limitations of your health insurance plan.  Call member services at your health plan with any benefit or coverage questions.                  Who to contact     If you have questions or need follow up information about today's clinic visit or your schedule please contact [facility] directly at [phone].  Normal or non-critical lab and imaging results will be communicated to you by Taaz[employer], letter or phone within 4 business days after the clinic has received the results. If you do not hear from us within 7 days, please contact the clinic through [employer] or phone. If you have a critical or abnormal lab result, we will notify you by phone as soon as possible.  Submit refill requests through [employer] or call your pharmacy and they will forward the refill request to us. Please allow 3 business days for your refill to be completed.          Additional Information About Your Visit        MyChart Information     [employer] gives you secure access to your electronic health record. If you see a primary care provider, you can also send messages to your care team and make appointments. If you have questions, please call your primary care clinic.   If you do not have a primary care provider, please call [phone] and they will assist you.        Care EveryWhere ID     This is your Care EveryWhere ID. This could be used by other organizations to access your [place] medical records  [id number]        Your Vitals Were     Pulse Last Period Breastfeeding? BMI (Body Mass Index)          86 06/18/2018 (Exact Date) No 33.66 kg/m2         Blood Pressure from Last 3 Encounters:   06/25/18 110/70   04/25/18 122/76   01/16/17 116/76    Weight from Last 3 Encounters:   06/25/18 86.2 kg (190 lb)   04/25/18 87.1 kg (192 lb)   01/16/17 90.7 kg (200 lb)              We Performed the Following     HBsAg Screen ([facility])     HIV 1/0/2 Rflx ([facility])     Pap Lb  Ct-Ng  rfx HPV ASCU ([facility])     Rapid Plasma Reagin  Quant ([facility])     Referral to [facility]        Primary Care Provider Office Phone # Fax #    Crystal Georgie Hilton [phone] [address] [place] [address]        Equal Access to Services     TENZIN STEVENSON AH: Hadii aad ku hadasho Soomaali, waaxda luqadaha, qaybta kaalmada adeegyada, waxay hilda torresn renard fajardo. So [facility] [phone].    ATENCIÓN: Si habla español, tiene a veloz disposición servicios gratuitos de asistencia lingüística. Queta al [phone].    We comply with applicable federal civil rights laws and [place] laws. We do not discriminate on the basis of race, color, national origin, age, disability, sex, sexual orientation, or gender identity.            Thank you!     Thank you for choosing [facility]  for your care. Our goal is always to provide you with excellent care. Hearing back from our patients is one way we can continue to improve our services. Please take a few minutes to complete the written survey that you may receive in the mail after your visit with us. Thank you!             Your Updated Medication List - Protect others around you:  Learn how to safely use, store and throw away your medicines at www.disposemymeds.org.          This list is accurate as of 6/25/18  1:30 PM.  Always use your most recent med list.                   Brand Name Dispense Instructions for use Diagnosis    ACETAMINOPHEN PO      Take 325 mg by mouth as needed for pain or fever        albuterol 108 (90 Base) MCG/ACT Inhaler    PROAIR HFA/PROVENTIL HFA/VENTOLIN HFA    1 Inhaler    Inhale 2 puffs into the lungs every 6 hours    Mild intermittent asthma without complication       BAND-IT FOREARM BAND Misc     1 each    Use on right forearm for the treatment of epicondylitis    Medial epicondylitis of right elbow       IBUPROFEN PO      Take 200 mg by mouth as needed for moderate pain or fever        loratadine 10 MG tablet    CLARITIN     Take 10 mg by mouth daily        nystatin cream    MYCOSTATIN    30 g    Apply topically to axillary rash TID prn. See MD if no resolution of rash.    Tinea corporis

## 2018-06-27 LAB
HBSAG SCREEN: NEGATIVE
HIV SCREEN 4TH GEN WITH RFLX: NON REACTIVE
RAPID PLASMA REAGIN, QUANT: NON REACTIVE

## 2018-06-28 LAB
.: NORMAL
.: NORMAL
CHLAMYDIA NUC ACID AMP: NEGATIVE
CLINICIAN PROVIDED ICD10: NORMAL
DIAGNOSIS:: NORMAL
GONOCOCCUS, NUC. ACID AMP: NEGATIVE
Lab: NORMAL
PERFORMED BY:: NORMAL
SPECIMEN ADEQUACY:: NORMAL

## 2018-07-02 ENCOUNTER — TRANSFERRED RECORDS (OUTPATIENT)
Dept: FAMILY MEDICINE | Facility: CLINIC | Age: 38
End: 2018-07-02

## 2018-07-03 ENCOUNTER — HOSPITAL PATHOLOGY (OUTPATIENT)
Dept: OTHER | Facility: CLINIC | Age: 38
End: 2018-07-03

## 2018-07-06 LAB — COPATH REPORT: NORMAL

## 2018-07-11 NOTE — PROGRESS NOTES
6/6/18 We faxed 4/25/18 office note with 4/25/18 lab results and 2/1/17 MRI brain w w/o contrast results ([facility]) to [facility] clinic [facility], [place]: Dr. Simental @ [phone]    Kenton Downing,   [facility]  [phone]

## 2018-07-11 NOTE — PROGRESS NOTES
6/6/18 We faxed 4/25/18 office note with 4/25/18 lab results and 2/1/17 MRI brain w w/o contrast results ([facility]) to [facility]inology clinic of [facility], [place]: Dr. Simental @ [phone]    Kenton Downing,   [facility]  [phone]

## 2018-08-16 NOTE — PROGRESS NOTES
18 Received fax from [facility].  They have not been able to schedule an appointment with the patient and the order has not .  Order was for MRI brain.  Orders cancelled    Kenton Downing,   [facility]  [phone]

## 2018-10-23 ENCOUNTER — MEDICAL CORRESPONDENCE (OUTPATIENT)
Dept: FAMILY MEDICINE | Facility: CLINIC | Age: 38
End: 2018-10-23

## 2019-07-03 ENCOUNTER — OFFICE VISIT (OUTPATIENT)
Dept: FAMILY MEDICINE | Facility: CLINIC | Age: 39
End: 2019-07-03

## 2019-07-03 VITALS
HEART RATE: 78 BPM | DIASTOLIC BLOOD PRESSURE: 82 MMHG | BODY MASS INDEX: 32.99 KG/M2 | HEIGHT: 65 IN | RESPIRATION RATE: 16 BRPM | WEIGHT: 198 LBS | SYSTOLIC BLOOD PRESSURE: 122 MMHG | OXYGEN SATURATION: 98 %

## 2019-07-03 DIAGNOSIS — Z00.01 ENCOUNTER FOR ROUTINE ADULT PHYSICAL EXAM WITH ABNORMAL FINDINGS: Primary | ICD-10-CM

## 2019-07-03 DIAGNOSIS — F33.2 SEVERE EPISODE OF RECURRENT MAJOR DEPRESSIVE DISORDER, WITHOUT PSYCHOTIC FEATURES (H): ICD-10-CM

## 2019-07-03 DIAGNOSIS — Z13.6 SCREENING FOR CARDIOVASCULAR CONDITION: ICD-10-CM

## 2019-07-03 DIAGNOSIS — L60.9 NAIL ABNORMALITY: ICD-10-CM

## 2019-07-03 DIAGNOSIS — L68.0 HIRSUTISM: ICD-10-CM

## 2019-07-03 DIAGNOSIS — D35.2 PITUITARY ADENOMA (H): ICD-10-CM

## 2019-07-03 DIAGNOSIS — J45.40 MODERATE PERSISTENT ASTHMA WITHOUT COMPLICATION: ICD-10-CM

## 2019-07-03 DIAGNOSIS — E66.09 OBESITY DUE TO EXCESS CALORIES WITHOUT SERIOUS COMORBIDITY, UNSPECIFIED CLASSIFICATION: ICD-10-CM

## 2019-07-03 DIAGNOSIS — Z91.018 FOOD ALLERGY: ICD-10-CM

## 2019-07-03 DIAGNOSIS — F41.1 GENERALIZED ANXIETY DISORDER: ICD-10-CM

## 2019-07-03 LAB
% GRANULOCYTES: 63.3 % (ref 42.2–75.2)
HCT VFR BLD AUTO: 38.6 % (ref 35–46)
HEMOGLOBIN: 13.1 G/DL (ref 11.8–15.5)
LYMPHOCYTES NFR BLD AUTO: 29.8 % (ref 20.5–51.1)
MCH RBC QN AUTO: 28.2 PG (ref 27–31)
MCHC RBC AUTO-ENTMCNC: 33.9 G/DL (ref 33–37)
MCV RBC AUTO: 83.4 FL (ref 80–100)
MONOCYTES NFR BLD AUTO: 6.9 % (ref 1.7–9.3)
PLATELET # BLD AUTO: 340 K/UL (ref 140–450)
RBC # BLD AUTO: 4.63 X10/CMM (ref 3.7–5.2)
WBC # BLD AUTO: 7.7 X10/CMM (ref 3.8–11)

## 2019-07-03 PROCEDURE — 99214 OFFICE O/P EST MOD 30 MIN: CPT | Mod: 25 | Performed by: FAMILY MEDICINE

## 2019-07-03 PROCEDURE — 85025 COMPLETE CBC W/AUTO DIFF WBC: CPT | Performed by: FAMILY MEDICINE

## 2019-07-03 PROCEDURE — 36415 COLL VENOUS BLD VENIPUNCTURE: CPT | Performed by: FAMILY MEDICINE

## 2019-07-03 PROCEDURE — G0439 PPPS, SUBSEQ VISIT: HCPCS | Performed by: FAMILY MEDICINE

## 2019-07-03 RX ORDER — MONTELUKAST SODIUM 10 MG/1
10 TABLET ORAL AT BEDTIME
Qty: 90 TABLET | Refills: 3 | Status: SHIPPED | OUTPATIENT
Start: 2019-07-03 | End: 2021-01-14

## 2019-07-03 RX ORDER — EPINEPHRINE 0.15 MG/.15ML
0.15 INJECTION SUBCUTANEOUS PRN
Qty: 2 EACH | Refills: 3 | Status: SHIPPED | OUTPATIENT
Start: 2019-07-03 | End: 2021-01-14

## 2019-07-03 ASSESSMENT — ANXIETY QUESTIONNAIRES
1. FEELING NERVOUS, ANXIOUS, OR ON EDGE: MORE THAN HALF THE DAYS
6. BECOMING EASILY ANNOYED OR IRRITABLE: SEVERAL DAYS
2. NOT BEING ABLE TO STOP OR CONTROL WORRYING: SEVERAL DAYS
GAD7 TOTAL SCORE: 9
IF YOU CHECKED OFF ANY PROBLEMS ON THIS QUESTIONNAIRE, HOW DIFFICULT HAVE THESE PROBLEMS MADE IT FOR YOU TO DO YOUR WORK, TAKE CARE OF THINGS AT HOME, OR GET ALONG WITH OTHER PEOPLE: EXTREMELY DIFFICULT
5. BEING SO RESTLESS THAT IT IS HARD TO SIT STILL: NOT AT ALL
7. FEELING AFRAID AS IF SOMETHING AWFUL MIGHT HAPPEN: SEVERAL DAYS
3. WORRYING TOO MUCH ABOUT DIFFERENT THINGS: MORE THAN HALF THE DAYS

## 2019-07-03 ASSESSMENT — PATIENT HEALTH QUESTIONNAIRE - PHQ9
5. POOR APPETITE OR OVEREATING: MORE THAN HALF THE DAYS
SUM OF ALL RESPONSES TO PHQ QUESTIONS 1-9: 24

## 2019-07-03 ASSESSMENT — MIFFLIN-ST. JEOR: SCORE: 1575.03

## 2019-07-03 NOTE — LETTER
My Depression Action Plan  Name: Jackeline Yeboah   Date of Birth 1980  Date: 7/3/2019    My doctor: Crystal Hilton   My clinic: [facility] [address] [place] [address] [phone]          GREEN    ZONE   Good Control    What it looks like:     Things are going generally well. You have normal up s and down s. You may even feel depressed from time to time, but bad moods usually last less than a day.   What you need to do:  1. Continue to care for yourself (see self care plan)  2. Check your depression survival kit and update it as needed  3. Follow your physician s recommendations including any medication.  4. Do not stop taking medication unless you consult with your physician first.           YELLOW         ZONE Getting Worse    What it looks like:     Depression is starting to interfere with your life.     It may be hard to get out of bed; you may be starting to isolate yourself from others.    Symptoms of depression are starting to last most all day and this has happened for several days.     You may have suicidal thoughts but they are not constant.   What you need to do:     1. Call your care team, your response to treatment will improve if you keep your care team informed of your progress. Yellow periods are signs an adjustment may need to be made.     2. Continue your self-care, even if you have to fake it!    3. Talk to someone in your support network    4. Open up your depression survival kit           RED    ZONE Medical Alert - Get Help    What it looks like:     Depression is seriously interfering with your life.     You may experience these or other symptoms: You can t get out of bed most days, can t work or engage in other necessary activities, you have trouble taking care of basic hygiene, or basic responsibilities, thoughts of suicide or death that will not go away, self-injurious behavior.     What you need to do:  1. Call your care team and  request a same-day appointment. If they are not available (weekends or after hours) call your local crisis line, emergency room or 911.            Depression Self Care Plan / Survival Kit    Self-Care for Depression  Here s the deal. Your body and mind are really not as separate as most people think.  What you do and think affects how you feel and how you feel influences what you do and think. This means if you do things that people who feel good do, it will help you feel better.  Sometimes this is all it takes.  There is also a place for medication and therapy depending on how severe your depression is, so be sure to consult with your medical provider and/ or Behavioral Health Consultant if your symptoms are worsening or not improving.     In order to better manage my stress, I will:    Exercise  Get some form of exercise, every day. This will help reduce pain and release endorphins, the  feel good  chemicals in your brain. This is almost as good as taking antidepressants!  This is not the same as joining a gym and then never going! (they count on that by the way ) It can be as simple as just going for a walk or doing some gardening, anything that will get you moving.      Hygiene   Maintain good hygiene (Get out of bed in the morning, Make your bed, Brush your teeth, Take a shower, and Get dressed like you were going to work, even if you are unemployed).  If your clothes don't fit try to get ones that do.    Diet  I will strive to eat foods that are good for me, drink plenty of water, and avoid excessive sugar, caffeine, alcohol, and other mood-altering substances.  Some foods that are helpful in depression are: complex carbohydrates, B vitamins, flaxseed, fish or fish oil, fresh fruits and vegetables.    Psychotherapy  I agree to participate in Individual Therapy (if recommended).    Medication  If prescribed medications, I agree to take them.  Missing doses can result in serious side effects.  I understand that  drinking alcohol, or other illicit drug use, may cause potential side effects.  I will not stop my medication abruptly without first discussing it with my provider.    Staying Connected With Others  I will stay in touch with my friends, family members, and my primary care provider/team.    Use your imagination  Be creative.  We all have a creative side; it doesn t matter if it s oil painting, sand castles, or mud pies! This will also kick up the endorphins.    Witness Beauty  (AKA stop and smell the roses) Take a look outside, even in mid-winter. Notice colors, textures. Watch the squirrels and birds.     Service to others  Be of service to others.  There is always someone else in need.  By helping others we can  get out of ourselves  and remember the really important things.  This also provides opportunities for practicing all the other parts of the program.    Humor  Laugh and be silly!  Adjust your TV habits for less news and crime-drama and more comedy.    Control your stress  Try breathing deep, massage therapy, biofeedback, and meditation. Find time to relax each day.     My support system    Clinic Contact:  Phone number:    Contact 1:  Phone number:    Contact 2:  Phone number:    Sikh/:  Phone number:    Therapist:  Phone number:    Local crisis center:    Phone number:    Other community support:  Phone number:

## 2019-07-03 NOTE — PATIENT INSTRUCTIONS

## 2019-07-03 NOTE — LETTER
My Asthma Action Plan  Name: Jacekline Yeboah   YOB: 1980  Date: 7/3/2019   My doctor: Crystal Hilton MD   My clinic: [facility]        My Control Medicine: None  My Rescue Medicine: Albuterol (Proair/Ventolin/Proventil) inhaler 108 mcg/act   My Asthma Severity: intermittent  Avoid your asthma triggers: Patient is unaware of triggers               GREEN ZONE   Good Control    I feel good    No cough or wheeze    Can work, sleep and play without asthma symptoms       Take your asthma control medicine every day.     1. If exercise triggers your asthma, take your rescue medication    15 minutes before exercise or sports, and    During exercise if you have asthma symptoms  2. Spacer to use with inhaler: If you have a spacer, make sure to use it with your inhaler             YELLOW ZONE Getting Worse  I have ANY of these:    I do not feel good    Cough or wheeze    Chest feels tight    Wake up at night   1. Keep taking your Green Zone medications  2. Start taking your rescue medicine:    every 20 minutes for up to 1 hour. Then every 4 hours for 24-48 hours.  3. If you stay in the Yellow Zone for more than 12-24 hours, contact your doctor.  4. If you do not return to the Green Zone in 12-24 hours or you get worse, start taking your oral steroid medicine if prescribed by your provider.           RED ZONE Medical Alert - Get Help  I have ANY of these:    I feel awful    Medicine is not helping    Breathing getting harder    Trouble walking or talking    Nose opens wide to breathe       1. Take your rescue medicine NOW  2. If your provider has prescribed an oral steroid medicine, start taking it NOW  3. Call your doctor NOW  4. If you are still in the Red Zone after 20 minutes and you have not reached your doctor:    Take your rescue medicine again and    Call 911 or go to the emergency room right away    See your regular doctor within 2 weeks of an Emergency Room or Urgent Care visit for  follow-up treatment.          Annual Reminders:  Meet with Asthma Educator,  Flu Shot in the Fall, consider Pneumonia Vaccination for patients with asthma (aged 19 and older).    Pharmacy:    [id number] - [place] [facility] [place] [place] - [address] [facility] [id number]  [place] [address] [facility] [address] [place], [place] - [address] AT [facility] [address]                      Asthma Triggers  How To Control Things That Make Your Asthma Worse    Triggers are things that make your asthma worse.  Look at the list below to help you find your triggers and what you can do about them.  You can help prevent asthma flare-ups by staying away from your triggers.      Trigger                                                          What you can do   Cigarette Smoke  Tobacco smoke can make asthma worse. Do not allow smoking in your home, car or around you.  Be sure no one smokes at a child s day care or school.  If you smoke, ask your health care provider for ways to help you quit.  Ask family members to quit too.  Ask your health care provider for a referral to Quit Plan to help you quit smoking, or call 7-282-511-PLAN.     Colds, Flu, Bronchitis  These are common triggers of asthma. Wash your hands often.  Don t touch your eyes, nose or mouth.  Get a flu shot every year.     Dust Mites  These are tiny bugs that live in cloth or carpet. They are too small to see. Wash sheets and blankets in hot water every week.   Encase pillows and mattress in dust mite proof covers.  Avoid having carpet if you can. If you have carpet, vacuum weekly.   Use a dust mask and HEPA vacuum.   Pollen and Outdoor Mold  Some people are allergic to trees, grass, or weed pollen, or molds. Try to keep your windows closed.  Limit time out doors when pollen count is high.   Ask you health care provider about taking medicine during allergy season.     Animal Dander  Some people are allergic  to skin flakes, urine or saliva from pets with fur or feathers. Keep pets with fur or feathers out of your home.    If you can t keep the pet outdoors, then keep the pet out of your bedroom.  Keep the bedroom door closed.  Keep pets off cloth furniture and away from stuffed toys.     Mice, Rats, and Cockroaches  Some people are allergic to the waste from these pests.   Cover food and garbage.  Clean up spills and food crumbs.  Store grease in the refrigerator.   Keep food out of the bedroom.   Indoor Mold  This can be a trigger if your home has high moisture. Fix leaking faucets, pipes, or other sources of water.   Clean moldy surfaces.  Dehumidify basement if it is damp and smelly.   Smoke, Strong Odors, and Sprays  These can reduce air quality. Stay away from strong odors and sprays, such as perfume, powder, hair spray, paints, smoke incense, paint, cleaning products, candles and new carpet.   Exercise or Sports  Some people with asthma have this trigger. Be active!  Ask your doctor about taking medicine before sports or exercise to prevent symptoms.    Warm up for 5-10 minutes before and after sports or exercise.     Other Triggers of Asthma  Cold air:  Cover your nose and mouth with a scarf.  Sometimes laughing or crying can be a trigger.  Some medicines and food can trigger asthma.
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
## 2019-07-04 LAB
ALBUMIN SERPL-MCNC: 4.3 G/DL (ref 3.5–5.5)
ALBUMIN/GLOB SERPL: 1.7 {RATIO} (ref 1.2–2.2)
ALP SERPL-CCNC: 71 IU/L (ref 39–117)
ALT SERPL-CCNC: 13 IU/L (ref 0–32)
AST SERPL-CCNC: 13 IU/L (ref 0–40)
BILIRUB SERPL-MCNC: 0.6 MG/DL (ref 0–1.2)
BUN SERPL-MCNC: 10 MG/DL (ref 6–20)
BUN/CREATININE RATIO: 14 (ref 9–23)
CALCIUM SERPL-MCNC: 9.1 MG/DL (ref 8.7–10.2)
CHLORIDE SERPLBLD-SCNC: 105 MMOL/L (ref 96–106)
CHOLEST SERPL-MCNC: 135 MG/DL (ref 100–199)
CREAT SERPL-MCNC: 0.69 MG/DL (ref 0.57–1)
EGFR IF AFRICN AM: 128 ML/MIN/1.73
EGFR IF NONAFRICN AM: 111 ML/MIN/1.73
GLOBULIN, TOTAL: 2.6 G/DL (ref 1.5–4.5)
GLUCOSE SERPL-MCNC: 85 MG/DL (ref 65–99)
HDLC SERPL-MCNC: 42 MG/DL
IRON BINDING CAP: 316 UG/DL (ref 250–450)
IRON SATURATION: 33 % (ref 15–55)
IRON: 104 UG/DL (ref 27–159)
LDL/HDL RATIO: 1.8 RATIO (ref 0–3.2)
LDLC SERPL CALC-MCNC: 75 MG/DL (ref 0–99)
POTASSIUM SERPL-SCNC: 4 MMOL/L (ref 3.5–5.2)
PROT SERPL-MCNC: 6.9 G/DL (ref 6–8.5)
SODIUM SERPL-SCNC: 142 MMOL/L (ref 134–144)
TOTAL CO2: 27 MMOL/L (ref 20–29)
TRIGL SERPL-MCNC: 88 MG/DL (ref 0–149)
UIBC: 212 UG/DL (ref 131–425)
VLDLC SERPL CALC-MCNC: 18 MG/DL (ref 5–40)

## 2019-07-04 ASSESSMENT — ANXIETY QUESTIONNAIRES: GAD7 TOTAL SCORE: 9

## 2019-07-04 ASSESSMENT — ASTHMA QUESTIONNAIRES: ACT_TOTALSCORE: 19

## 2019-07-05 ENCOUNTER — TELEPHONE (OUTPATIENT)
Dept: FAMILY MEDICINE | Facility: CLINIC | Age: 39
End: 2019-07-05

## 2019-07-05 NOTE — TELEPHONE ENCOUNTER
Called patient per Dr. Hilton. Left message for patient to call back to clinic to schedule lab appointment for Genesight Testing. Patient was seen by Dr. Hilton 7/3/19 but forgot to get swab done.   Patient is to return to clinic with lab appointment for Genesight testing.  Sussy Boo CMA  July 5, 2019

## 2019-07-10 ENCOUNTER — TRANSFERRED RECORDS (OUTPATIENT)
Dept: FAMILY MEDICINE | Facility: CLINIC | Age: 39
End: 2019-07-10

## 2019-07-12 ENCOUNTER — OFFICE VISIT (OUTPATIENT)
Dept: FAMILY MEDICINE | Facility: CLINIC | Age: 39
End: 2019-07-12

## 2019-07-12 VITALS
RESPIRATION RATE: 18 BRPM | BODY MASS INDEX: 33.2 KG/M2 | WEIGHT: 198 LBS | DIASTOLIC BLOOD PRESSURE: 80 MMHG | HEART RATE: 106 BPM | OXYGEN SATURATION: 97 % | SYSTOLIC BLOOD PRESSURE: 102 MMHG

## 2019-07-12 DIAGNOSIS — F33.1 MODERATE EPISODE OF RECURRENT MAJOR DEPRESSIVE DISORDER (H): Primary | ICD-10-CM

## 2019-07-12 PROCEDURE — 99214 OFFICE O/P EST MOD 30 MIN: CPT | Performed by: NURSE PRACTITIONER

## 2019-07-12 ASSESSMENT — PATIENT HEALTH QUESTIONNAIRE - PHQ9
SUM OF ALL RESPONSES TO PHQ QUESTIONS 1-9: 18
5. POOR APPETITE OR OVEREATING: MORE THAN HALF THE DAYS

## 2019-07-12 ASSESSMENT — ANXIETY QUESTIONNAIRES
GAD7 TOTAL SCORE: 10
1. FEELING NERVOUS, ANXIOUS, OR ON EDGE: MORE THAN HALF THE DAYS
2. NOT BEING ABLE TO STOP OR CONTROL WORRYING: MORE THAN HALF THE DAYS
IF YOU CHECKED OFF ANY PROBLEMS ON THIS QUESTIONNAIRE, HOW DIFFICULT HAVE THESE PROBLEMS MADE IT FOR YOU TO DO YOUR WORK, TAKE CARE OF THINGS AT HOME, OR GET ALONG WITH OTHER PEOPLE: VERY DIFFICULT
6. BECOMING EASILY ANNOYED OR IRRITABLE: SEVERAL DAYS
5. BEING SO RESTLESS THAT IT IS HARD TO SIT STILL: NOT AT ALL
7. FEELING AFRAID AS IF SOMETHING AWFUL MIGHT HAPPEN: SEVERAL DAYS
3. WORRYING TOO MUCH ABOUT DIFFERENT THINGS: MORE THAN HALF THE DAYS

## 2019-07-12 NOTE — PATIENT INSTRUCTIONS
We met today to discuss treatment options for depression including pharmacological options. Jackeline will consider future visits to discuss treatment options. Gene Sight testing was done today, we will call when we have results. Please call with any questions or concerns in the mean time.

## 2019-07-12 NOTE — PROGRESS NOTES
Subjective     Jackeline Yeboah is a 39 year old female who presents to clinic today for the following health issues:    HPI   Depression and Anxiety Follow-Up    How are you doing with your depression since your last visit? No change    How are you doing with your anxiety since your last visit?  No change    Are you having other symptoms that might be associated with depression or anxiety? No    Have you had a significant life event? No     Do you have any concerns with your use of alcohol or other drugs? No    Social History     Tobacco Use     Smoking status: Never Smoker     Smokeless tobacco: Never Used   Substance Use Topics     Alcohol use: No     Alcohol/week: 0.0 oz     Drug use: No     PHQ 7/3/2019   PHQ-9 Total Score 24   Q9: Thoughts of better off dead/self-harm past 2 weeks More than half the days     MAXINE-7 SCORE 6/30/2015 7/3/2019   Total Score 14 -   Total Score - 9       Suicide Assessment Five-step Evaluation and Treatment (SAFE-T)    Amount of exercise or physical activity: 6-7 days/week for an average of 30-45 minutes    Problems taking medications regularly: No    Medication side effects: none    Diet: regular (no restrictions)      Current Outpatient Medications   Medication Sig Dispense Refill     ACETAMINOPHEN PO Take 325 mg by mouth as needed for pain or fever       albuterol (PROAIR HFA/PROVENTIL HFA/VENTOLIN HFA) 108 (90 Base) MCG/ACT Inhaler Inhale 2 puffs into the lungs every 6 hours 1 Inhaler 2     Elastic Bandages & Supports (BAND-IT FOREARM BAND) MISC Use on right forearm for the treatment of epicondylitis 1 each 0     EPINEPHrine (ADRENACLICK JR) 0.15 MG/0.15ML injection 2-pack Inject 0.15 mLs (0.15 mg) into the muscle as needed for anaphylaxis 2 each 3     IBUPROFEN PO Take 200 mg by mouth as needed for moderate pain or fever       loratadine (CLARITIN) 10 MG tablet Take 10 mg by mouth daily       montelukast (SINGULAIR) 10 MG tablet Take 1 tablet (10 mg) by mouth At Bedtime 90  tablet 3     nystatin (MYCOSTATIN) cream Apply topically to axillary rash TID prn. See MD if no resolution of rash. 30 g 0     Allergies   Allergen Reactions     Apple Shortness Of Breath     Red apples     Corn Syrup [Glucose] Shortness Of Breath     Grass Shortness Of Breath     No Clinical Screening - See Comments Rash     Sun - skin reaction     Dust Mite Extract Difficulty breathing     Milk Protein Extract Other (See Comments) and GI Disturbance     Joint pain     Trees Difficulty breathing     Dogs Rash     Latex Rash     Peanuts [Nuts] Rash       Reviewed and updated as needed this visit by Provider         Review of Systems   ROS COMP: CONSTITUTIONAL:NEGATIVE for fever, chills, change in weight  ENDOCRINE: NEGATIVE for temperature intolerance, skin/hair changes  PSYCHIATRIC: POSITIVE for depressed mood and Hx depression, anxiety, Hx of anxiety      Objective    /80   Pulse 106   Resp 18   Wt 89.8 kg (198 lb)   LMP 06/29/2019   SpO2 97%   BMI 33.20 kg/m    Body mass index is 33.2 kg/m .  Physical Exam   GENERAL: healthy, alert and no distress  NEURO: Normal strength and tone, mentation intact and speech normal  PSYCH: mentation appears normal, affect normal/bright   Alertness:  alert  and oriented  Appearance:  well groomed  Behavior/Demeanor:  cooperative, pleasant and calm, with good  eye contact.  Speech:  normal and regular rate and rhythm  Psychomotor:  normal or unremarkable    Mood:  Depressed  Affect:  full range and was congruent to speech content.  Thought Process/Associations: unremarkable   Thought Content: . No SI, HI, thoughts of self harm, delusional thinking or paranoia.   Perception: devoid of  auditory hallucinations, visual hallucinations, depersonalization and derealization  Insight:  good.  Judgment: good.  Attention/Concentration:  Fair  Language:  Intact  Fund of Knowledge:  Above average.    Memory:  Immediate recall intact, Short-term memory intact and Long-term memory  intact.      These cognitive functions grossly appear as described, but were not formally tested.    Assessment  1. Moderate episode of recurrent major depressive disorder (H)        Plan  We met today to discuss treatment options for depression including pharmacological options. Jackeline will consider future visits to discuss treatment options. Gene Sight testing was done today, we will call when we have results. Please call with any questions or concerns in the mean time.

## 2019-07-12 NOTE — NURSING NOTE
I informed patient of delay in sending sample due to [place] requirement of ordering physician signature needed on pts with Medicare primary insurance.  Kamla Viera NP informed  Dr Hilton in office on 7/17/19  Rosa Elena Whitley MA July 12, 2019 4:49 PM

## 2019-07-13 ASSESSMENT — ANXIETY QUESTIONNAIRES: GAD7 TOTAL SCORE: 10

## 2019-07-16 ENCOUNTER — TELEPHONE (OUTPATIENT)
Dept: FAMILY MEDICINE | Facility: CLINIC | Age: 39
End: 2019-07-16

## 2019-07-16 DIAGNOSIS — D35.2 PITUITARY ADENOMA (H): Primary | ICD-10-CM

## 2019-07-16 NOTE — TELEPHONE ENCOUNTER
----- Message from Crystal Hilton MD sent at 7/15/2019  3:11 PM CDT -----  No apparent change in pituitary adenoma. I still recommend follow up with endocrinology.

## 2019-07-16 NOTE — TELEPHONE ENCOUNTER
Left message for patient to call nurse regarding MRI brain results.     Confirmed with endocrine, patient has not seen them since 2017 regarding pituitary adenoma. Faxed MRI, labs since 2017 and last visit note with Dr. Hilton to endocrine.  Patient can call to schedule consult.  Maite Palomino RN

## 2019-07-23 ENCOUNTER — TELEPHONE (OUTPATIENT)
Dept: FAMILY MEDICINE | Facility: CLINIC | Age: 39
End: 2019-07-23

## 2019-07-24 NOTE — TELEPHONE ENCOUNTER
Dr. Hilton reviewed [employer] testing results and recommends trial of Pristiq or Viibryd - whichever insurance covers best.   Called patient with plan. Patient interested but would like to research and consider these meds first. Gave patient drug names and mailed Aconex results per her request. Encouraged patient to call with her preference or any questions she may have.   Maite Palomino RN

## 2019-07-24 NOTE — TELEPHONE ENCOUNTER
July 23, 2019 745pm spoke with patient this evening. Patient not planning to revisit with [facility]. Plans to schedule with endocrine friend recommended. Patient doesn't have the contact info on hand right now but states will call us once she makes appt with them so we can send them scan reports, labs and notes regarding her pituitary adenoma.  Maite Palomino RN

## 2019-09-16 NOTE — TELEPHONE ENCOUNTER
9/9/19 Received a fax from [facility].  Patient did not schedule with their office.  Order was closed.    Kenton Downing,   [facility]  [phone]

## 2020-01-20 ENCOUNTER — TELEPHONE (OUTPATIENT)
Dept: FAMILY MEDICINE | Facility: CLINIC | Age: 40
End: 2020-01-20

## 2020-01-20 NOTE — TELEPHONE ENCOUNTER
I have left a message for the patient to call the office to schedule an appointment.    Kenton Downing,   [facility]  [phone]

## 2020-02-25 ENCOUNTER — MEDICAL CORRESPONDENCE (OUTPATIENT)
Dept: FAMILY MEDICINE | Facility: CLINIC | Age: 40
End: 2020-02-25

## 2020-03-02 ENCOUNTER — HEALTH MAINTENANCE LETTER (OUTPATIENT)
Age: 40
End: 2020-03-02

## 2020-07-10 ENCOUNTER — MYC MEDICAL ADVICE (OUTPATIENT)
Dept: SLEEP MEDICINE | Facility: CLINIC | Age: 40
End: 2020-07-10

## 2020-07-14 ENCOUNTER — VIRTUAL VISIT (OUTPATIENT)
Dept: SLEEP MEDICINE | Facility: CLINIC | Age: 40
End: 2020-07-14
Payer: COMMERCIAL

## 2020-07-14 VITALS — WEIGHT: 200 LBS | BODY MASS INDEX: 34.15 KG/M2 | HEIGHT: 64 IN

## 2020-07-14 DIAGNOSIS — F41.9 ANXIETY: ICD-10-CM

## 2020-07-14 DIAGNOSIS — E66.811 OBESITY (BMI 30.0-34.9): ICD-10-CM

## 2020-07-14 DIAGNOSIS — F32.A DEPRESSION, UNSPECIFIED DEPRESSION TYPE: ICD-10-CM

## 2020-07-14 DIAGNOSIS — G47.33 OSA (OBSTRUCTIVE SLEEP APNEA): Primary | ICD-10-CM

## 2020-07-14 PROCEDURE — 99204 OFFICE O/P NEW MOD 45 MIN: CPT | Mod: 95 | Performed by: PHYSICIAN ASSISTANT

## 2020-07-14 ASSESSMENT — MIFFLIN-ST. JEOR: SCORE: 1567.19

## 2020-07-14 NOTE — PROGRESS NOTES
"Jackeline Yeboah is a 40 year old female who is being evaluated via a billable video visit.      The patient has been notified of following:     \"This video visit will be conducted via a call between you and your physician/provider. We have found that certain health care needs can be provided without the need for an in-person physical exam.  This service lets us provide the care you need with a video conversation.  If a prescription is necessary we can send it directly to your pharmacy.  If lab work is needed we can place an order for that and you can then stop by our lab to have the test done at a later time.    Video visits are billed at different rates depending on your insurance coverage.  Please reach out to your insurance provider with any questions.    If during the course of the call the physician/provider feels a video visit is not appropriate, you will not be charged for this service.\"    Patient has given verbal consent for Video visit? Yes  How would you like to obtain your AVS? Sphart  Patient would like the video invitation sent by: Send to e-mail at: [email or web address]  Will anyone else be joining your video visit? No        Video-Visit Details    Type of service:  Video Visit    Video Start Time: 12:38PM  Video End Time: 1:21PM    Originating Location (pt. Location): Home    Distant Location (provider location):  [facility]     Platform used for Video Visit: Rob Rae PA-C    [facility]   Outpatient Sleep Medicine Consultation  July 14, 2020      Name: Jackeline Yeboah MRN# [id number]   Age: 40 year old YOB: 1980     Date of Consultation: July 14, 2020  Consultation is requested by: No referring provider defined for this encounter. No ref. provider found  Primary care provider: Crystal Hilton       Chief Complaint / Reason for Sleep Consult:     \"Update sleep prescription\"         History of Present Illness: " "    Jackeline Yeboah is a 40 year old female who presents to the clinic to establish care for obstructive sleep apnea. Other past medical history significant for pituitary adenoma, obesity, asthma, allergies, depression, and anxiety.      Originally diagnosed with sleep apnea via in lab sleep study at [facility] in 5/2009 and has been treated with CPAP ever since. Patient does not recall sleep apnea severity at time of diagnosis and unfortunately records are not available today for me to review.     Primary reason for today's visit is to obtain prescription for new supplies/mask. Patient is very happy with her CPAP overall. States that all of her symptoms resolved after staring therapy - \"I used to snore all the time and stopped breathing and was waking up all the time and had nightmares and they all stopped when I started CPAP\".  Changed to ResMed nasal pillow p30i in February and is tolerating much better than previous mask. Mask is comfortable and is not leaking or causing any irritation. Reports 100% compliance with therapy \"as long as I have power like I don't use it if I am camping\". Pressures set to APAP [id number] and are comfortable.     PAP download information unavailable today. Patient downloaded SIM card to her computer and read me some of the information. Per patient - days used 30/30, 0 nonuse days, 30 day AHI 0.37, average use 6.78 hours per night, leak 95% 2.40    Sleep schedule varies somewhat. Bedtime anytime between 12:00-2:00AM. Once in bed, will fall asleep within 20-60 minutes. Admits to occasional sleep waiting, but typically will meditate to help her fall asleep. Wakes for the day at 9:00AM without an alarm. Typically wakes up 1-2 times per night due to \"other medical issues with inflammation and pain\", typically takes <10 minutes to return to sleep. Sleep schedule same on weekends.     No planned naps. No inadvertent napping. Denies any personal history of falling asleep or dozing " "while driving. No accidents, near accidents, or dozing at stoplights or stop signs. Patient was counseled on the importance of driving while alert, to pull over if drowsy, or nap before getting into the vehicle if sleepy.    Patient does not do shift work. 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     No dream enactment behavior. Admits to somniloquy \"only if I am sick\".  History of somnambulism \"when under extreme stress\", three times in life, not in \"years\" and not since starting CPAP. No sleep related eating. History of frequent nightmares that resolved after starting CPAP. History of bruxism, historically wore bite guard.     Patient denies typical restless legs syndrome symptoms. Does admit to occasional leg movements.     SCALES       INSOMNIA:  Insomnia severity score: 19/28       SLEEPINESS: [place] sleepiness scale: 7 [normal < 11]          Medications:     Current Outpatient Medications   Medication Sig     ACETAMINOPHEN PO Take 325 mg by mouth as needed for pain or fever     albuterol (PROAIR HFA/PROVENTIL HFA/VENTOLIN HFA) 108 (90 Base) MCG/ACT Inhaler Inhale 2 puffs into the lungs every 6 hours     Elastic Bandages & Supports (BAND-IT FOREARM BAND) MISC Use on right forearm for the treatment of epicondylitis     EPINEPHrine (ADRENACLICK JR) 0.15 MG/0.15ML injection 2-pack Inject 0.15 mLs (0.15 mg) into the muscle as needed for anaphylaxis     IBUPROFEN PO Take 200 mg by mouth as needed for moderate pain or fever     loratadine (CLARITIN) 10 MG tablet Take 10 mg by mouth daily     montelukast (SINGULAIR) 10 MG tablet Take 1 tablet (10 mg) by mouth At Bedtime     nystatin (MYCOSTATIN) cream Apply topically to axillary rash TID prn. See MD if no resolution of rash.     No current facility-administered medications for this visit.              Allergies:     Allergies   Allergen Reactions     Apple Shortness Of Breath     Red apples     Corn Syrup [Glucose] Shortness Of Breath     Grass Shortness Of Breath     No Clinical " "Screening - See Comments Rash     Sun - skin reaction     Dust Mite Extract Difficulty breathing     Milk Protein Extract Other (See Comments) and GI Disturbance     Joint pain     Trees Difficulty breathing     Dogs Rash     Latex Rash     Peanuts [Nuts] Rash            Past Medical History:     Past Medical History:   Diagnosis Date     Allergic rhinitis      Anxiety      Depressive disorder      Diabetes (H)      Pituitary adenoma (H)      Uncomplicated asthma              Past Surgical History:    Previous upper airway surgery: none   Past Surgical History:   Procedure Laterality Date     C LAP OVARIAN CYSTOTOMY  2007            Social History:     Social History     Tobacco Use     Smoking status: Never Smoker     Smokeless tobacco: Never Used   Substance Use Topics     Alcohol use: No     Alcohol/week: 0.0 standard drinks     Chemical History:  Alcohol use: No    Tobacco use: None   Illicit substances: None  Caffeine intake: Drinks no caffeine.          Family History:     Family History   Problem Relation Age of Onset     Hypertension Mother      Hypertension Father      Hyperlipidemia Mother      Hyperlipidemia Father      Diabetes Mother 40     Prostate Cancer Maternal Grandmother 50        lukemia     Prostate Cancer Paternal Grandmother 65        stomack Cancer     Depression Mother      Asthma Mother      Asthma Father      Asthma Brother      Obesity Mother 40        Sleep Family Hx: Patient denies any known family history of sleep apnea, restless legs syndrome, insomnia, or parasomnias.   All snore very loudly - chainsaw version of snoring and mom talks in her sleep          Physical Examination:   Ht 1.626 m (5' 4\")   Wt 90.7 kg (200 lb)   BMI 34.33 kg/m    General appearance: Awake, alert, cooperative. Well groomed. Sitting comfortably in chair. In no apparent distress.  HEENT: Head: Normocephalic, atraumatic. Eyes:Conjunctiva clear. Sclera normal. Nose: External appearance without deformity. "   Neck: No visible thyroid enlargement.   Cardiovascular: No JVD  Pulmonary:  Able to speak easily in full sentences. No cough or wheeze.   Skin:  No rashes or significant lesions on visible skin.   Neurologic: Alert, oriented x3.   Psychiatric: Mood euthymic. Affect congruent with full range and intensity.            Data: All pertinent previous laboratory data reviewed     No results found for: PH, PHARTERIAL, PO2, [id number], SAT, PCO2, HCO3, BASEEXCESS, VELVET, BEB  Lab Results   Component Value Date    TSH 2.80 02/03/2017     Lab Results   Component Value Date    GLC 85 07/03/2019    GLC 82 04/25/2018     Lab Results   Component Value Date    HGB 13.1 07/03/2019    HGB 13.8 01/28/2016     Lab Results   Component Value Date    BUN 10 07/03/2019    BUN 14 07/03/2019    CR 0.69 07/03/2019    CR 0.76 01/16/2017     Lab Results   Component Value Date    AST 13 07/03/2019    AST 13 01/16/2017    ALT 13 07/03/2019    ALT 20 01/16/2017    ALKPHOS 71 07/03/2019    ALKPHOS 76 01/16/2017    BILITOTAL 0.6 07/03/2019    BILITOTAL 0.3 01/16/2017     No results found for: UAMP, UBARB, BENZODIAZEUR, UCANN, UCOC, OPIT, UPCP           Assessment and Plan:   1. YONATAN (obstructive sleep apnea)  2. Depression, unspecified depression type  3. Anxiety  4. Obesity (BMI 30.0-34.9)    Patient presents to establish care for known obstructive sleep apnea that has been subjectively successfully treated with CPAP therapy for the past 11 years. Original study and PAP download both unavailable for me to review today, will request records. Per patient report of her SIM card data, patient has excellent compliance and good disease control with residual AHI of 0.38. Tolerating PAP pressures well, will continue at current pressures autoPAP [id number]. Prescription cannot be written for new mask/supplies today as we do not know sleep apnea severity. Will send once we obtain previous sleep study.     Return to clinic in minimally 1 year, or sooner as  needed.         Copy to: Crystal Hilton PA-C  Jul 14, 2020     [facility]  [address] , [place] [address]     [facility]  [address] [address]

## 2020-07-15 ENCOUNTER — TELEPHONE (OUTPATIENT)
Dept: SLEEP MEDICINE | Facility: CLINIC | Age: 40
End: 2020-07-15

## 2020-12-20 ENCOUNTER — HEALTH MAINTENANCE LETTER (OUTPATIENT)
Age: 40
End: 2020-12-20

## 2021-01-14 ENCOUNTER — VIRTUAL VISIT (OUTPATIENT)
Dept: FAMILY MEDICINE | Facility: CLINIC | Age: 41
End: 2021-01-14

## 2021-01-14 DIAGNOSIS — F33.2 SEVERE EPISODE OF RECURRENT MAJOR DEPRESSIVE DISORDER, WITHOUT PSYCHOTIC FEATURES (H): ICD-10-CM

## 2021-01-14 DIAGNOSIS — F41.9 ANXIETY: ICD-10-CM

## 2021-01-14 DIAGNOSIS — Z91.018 FOOD ALLERGY: ICD-10-CM

## 2021-01-14 DIAGNOSIS — J45.20 MILD INTERMITTENT ASTHMA WITHOUT COMPLICATION: Primary | ICD-10-CM

## 2021-01-14 DIAGNOSIS — B37.9 CANDIDA INFECTION: ICD-10-CM

## 2021-01-14 DIAGNOSIS — M65.4 DE QUERVAIN'S DISEASE (TENOSYNOVITIS): ICD-10-CM

## 2021-01-14 PROCEDURE — 99214 OFFICE O/P EST MOD 30 MIN: CPT | Mod: 95 | Performed by: NURSE PRACTITIONER

## 2021-01-14 RX ORDER — EPINEPHRINE 0.15 MG/.15ML
0.15 INJECTION SUBCUTANEOUS PRN
Qty: 2 EACH | Refills: 3 | Status: SHIPPED | OUTPATIENT
Start: 2021-01-14

## 2021-01-14 RX ORDER — NYSTATIN 100000 U/G
CREAM TOPICAL
Qty: 30 G | Refills: 0 | Status: CANCELLED | OUTPATIENT
Start: 2021-01-14

## 2021-01-14 RX ORDER — FLUCONAZOLE 150 MG/1
150 TABLET ORAL
Qty: 4 TABLET | Refills: 0 | Status: SHIPPED | OUTPATIENT
Start: 2021-01-14 | End: 2021-02-25

## 2021-01-14 RX ORDER — ALBUTEROL SULFATE 90 UG/1
2 AEROSOL, METERED RESPIRATORY (INHALATION) EVERY 6 HOURS
Qty: 1 INHALER | Refills: 2 | Status: SHIPPED | OUTPATIENT
Start: 2021-01-14 | End: 2022-03-29

## 2021-01-14 ASSESSMENT — ANXIETY QUESTIONNAIRES
IF YOU CHECKED OFF ANY PROBLEMS ON THIS QUESTIONNAIRE, HOW DIFFICULT HAVE THESE PROBLEMS MADE IT FOR YOU TO DO YOUR WORK, TAKE CARE OF THINGS AT HOME, OR GET ALONG WITH OTHER PEOPLE: EXTREMELY DIFFICULT
GAD7 TOTAL SCORE: 8
5. BEING SO RESTLESS THAT IT IS HARD TO SIT STILL: SEVERAL DAYS
7. FEELING AFRAID AS IF SOMETHING AWFUL MIGHT HAPPEN: NOT AT ALL
3. WORRYING TOO MUCH ABOUT DIFFERENT THINGS: SEVERAL DAYS
2. NOT BEING ABLE TO STOP OR CONTROL WORRYING: SEVERAL DAYS
6. BECOMING EASILY ANNOYED OR IRRITABLE: SEVERAL DAYS
1. FEELING NERVOUS, ANXIOUS, OR ON EDGE: MORE THAN HALF THE DAYS

## 2021-01-14 ASSESSMENT — PATIENT HEALTH QUESTIONNAIRE - PHQ9
5. POOR APPETITE OR OVEREATING: MORE THAN HALF THE DAYS
SUM OF ALL RESPONSES TO PHQ QUESTIONS 1-9: 18

## 2021-01-14 NOTE — PROGRESS NOTES
Problem(s) Oriented visit      Video-Visit Details    Type of service:  Video Visit    Video Start Time (time video started): 1420    Video End Time (time video stopped): 1435    Originating Location (pt. Location): Home    Distant Location (provider location):  [facility]     Mode of Communication:  Video Conference via [employer]- camera turned on to evaluate wrist, otherwise camera off per patient    Physician has received verbal consent for a Video Visit from the patient? Yes      MJ Wells CNP        SUBJECTIVE:                                                    Jackeline Yeboah is a 40 year old female who presents to clinic today for the following health issues :    CC:  Asthma: Mild and intermittent, brought on by allergens. Rare use of albuterol. No hospitalizations within the past year. Previously on montelukast but stopped this, controlled with loratadine.  Needs refill of epi pen as well, states she uses pediatric dose epi pen due to significantly reacting to typical doses of medications. Has not seen allergist in many years.    Skin concerns: States she gets yeast infections under her arms- no significant redness, but states her axillae become swollen and irritated with enlarged axillary nodes and sweating. States this has been going on for a few months. Denies any other swollen nodes or unintended weight loss. Typically uses nystatin cream with improvement. However, she also notes thick, clumpy, white vaginal discharge with vulvar itching and burning, wondering if there is something that can fix both issues.    Significant depression and anxiety- states she is following a therapist and denies need for further intervention.  PHQ 7/3/2019 7/12/2019 1/14/2021   PHQ-9 Total Score 24 18 18   Q9: Thoughts of better off dead/self-harm past 2 weeks More than half the days Several days Not at all     MAXINE-7 SCORE 7/3/2019 7/12/2019 1/14/2021   Total Score - - -   Total Score 9 10 8        R wrist pain: Woke up with R wrist pain on 12/19, thinks this may have been injured due to abnormal sleep behavior. Pain is improving over time, but hurts when she manipulates the thumb. Not swollen but is tender to touch and worse with wrist ROM. Improves with naproxen, using Icy Hot at times. Using a wrist brace at night. No numbness or tingling. She would like to see physical therapy.        Problem list, Medication list, Allergies, and Medical/Social/Surgical histories reviewed in [facility] and updated as appropriate.   Additional history: as documented    ROS:  Gen, resp, , MSK, neuro, skin, psych negative except as listed per HPI      OBJECTIVE:                                                    No vitals taken- virtual visit. Video on wrist only    Resp: speaking in full sentences without apparent dyspnea, wheezing, or cough  MSK: R wrist without visible erythema, edema, or abnormality. Pain with Finkelstein test when pt performed this per self.  Psych: Pleasant and interactive, affect euthymic, makes appropriate eye contact, answers questions appropriately, thought content logical         ASSESSMENT/PLAN:                                                          Jackeline was seen today for recheck medication and vaginal problem.    Diagnoses and all orders for this visit:    Mild intermittent asthma without complication  -     albuterol (PROAIR HFA/PROVENTIL HFA/VENTOLIN HFA) 108 (90 Base) MCG/ACT inhaler; Inhale 2 puffs into the lungs every 6 hours    ACT score 21, continue with loratadine, avoidance of triggers, and sparing use of albuterol. To update me if symptoms worsen and/or if she needs to restart montelukast. Asthma action plan filled out.    Food allergy  -     EPINEPHrine (ADRENACLICK JR) 0.15 MG/0.15ML injection 2-pack; Inject 0.15 mLs (0.15 mg) into the muscle as needed for anaphylaxis    Confirmed with Jackeline that this is an effective dose for her, epinephrine refilled    Candida  infection  -     fluconazole (DIFLUCAN) 150 MG tablet; Take 1 tablet (150 mg) by mouth every 7 days    Unable to visually assess this- given that symptoms tend to improve with nystatin, will treat as candida infection (both axillary and vulvovaginal) with fluconazole 150mg PO once weekly x4 weeks. May trial OTC clotrimazole as this may be more effective than nystatin given the antiinflammatory effects. However, we discussed concern for persistently swollen nodes and that if symptoms do not improve she needs to be evaluated in person.    De Quervain's disease (tenosynovitis)  -     [facility] PT[facility] HAND, AND CHIROPRACTIC REFERRAL; Future    Suspect DeQuervain's tenosynovitis, though difficult to assess virtually- discussed in person visit which she declines. Reviewed thumb spica brace, NSAIDs, heat/ice. PT referral placed per her request as well. To be seen if symptoms worsen or fail to improve.    Severe episode of recurrent major depressive disorder, without psychotic features (H)  Anxiety    States this is being treated with therapy and denies need for further assessment or intervention      The following health maintenance items are reviewed in Epic and correct as of today:  Health Maintenance   Topic Date Due     Pneumococcal Vaccine: Pediatrics (0 to 5 Years) and At-Risk Patients (6 to 64 Years) (1 of 1 - PPSV23) 03/27/1987     HIV SCREENING  03/27/1996     HEPATITIS C SCREENING  03/27/1999     DTAP/TDAP/TD IMMUNIZATION (4 - Tdap) 10/07/2014     PAP  06/30/2018     ASTHMA CONTROL TEST  01/03/2020     MEDICARE ANNUAL WELLNESS VISIT  07/03/2020     ASTHMA ACTION PLAN  07/03/2020     INFLUENZA VACCINE (1) 09/01/2020     PHQ-9  07/14/2021     DEPRESSION ACTION PLAN  Completed     IPV IMMUNIZATION  Completed     MENINGITIS IMMUNIZATION  Aged Out     HEPATITIS B IMMUNIZATION  Aged Out       Patient Instructions     Patient Education     De Quervain Tenosynovitis    De Quervain tenosynovitis is inflammation of tendons and  synovium on the thumb side of the wrist. Tendons are fibers that attach muscle to bone. Synovium is a slick membrane that helps tendons move. Movements done over and over can irritate and inflame these tissues. This can cause pain when you touch or grasp objects, turn or twist your wrist, or make a fist. You may also have pain and swelling near the base of the thumb or numbness along the back of your thumb and index finger. You may also feel the thumb catch or snap when you move it.  Treatment will depend on how bad the symptoms are. It can often be treated with medicines, injections, splinting, and home care. If your case is severe, you may be referred to a specialist to talk about surgery.  Home care  Your healthcare provider may prescribe medicines to relieve pain and reduce inflammation. A steroid medicine may be injected near the tendons. This reduces swelling and pain. The healthcare provider may also suggest taking over-the-counter medicines such as ibuprofen or naproxen. These help reduce inflammation. Take all medicines only as directed.  The following are general care guidelines:    Avoid repetitive movements of your wrist and thumb.    Note any activity that causes pain or swelling. If possible, avoid or limit that activity.    Put a cold pack on your thumb. To make an ice pack, put ice cubes in a plastic bag that seals at the top. Wrap the bag in a clean, thin towel or cloth. Hold this to your thumb for up to 20 minutes at a time. Don't put ice directly on the skin.    Your healthcare provider may put a splint on the thumb to hold it still. Use the splint as you have been instructed. In some cases, you may need to use a splint 24 hours a day for 4 to 6 weeks. This will allow the wrist and thumb to heal.  Follow-up care  Follow up with your healthcare provider, or as advised. You may need more treatment if your injury is severe or if your symptoms don't get better. This additional treatment may  include local injections, physical therapy, and surgery.  When to seek medical advice  Call your healthcare provider right away if any of these occur:    Increase in pain or swelling    You have fever, chills, redness, warmth, or drainage    Symptoms get worse after taking medicine    Pain spreads farther down the thumb or into the forearm    Pain continues to get in the way of daily life  Josee last reviewed this educational content on 6/1/2018 2000-2020 [employer]. [address] [place] [address]. All rights reserved. This information is not intended as a substitute for professional medical care. Always follow your healthcare professional's instructions.           Thumb spica, naproxen, heat/ice, and physical therapy for the wrist- be seen in person if this fails to improve over the next couple of weeks      My Asthma Action Plan    Name: Jackeline Yeboah   YOB: 1980  Date: 1/14/2021   My doctor: MJ Wells CNP   My clinic: [facility]        My Rescue Medicine:   Albuterol inhaler (Proair/Ventolin/Proventil HFA)  2-4 puffs EVERY 4 HOURS as needed. Use a spacer if recommended by your provider.   My Asthma Severity:   Intermittent / Exercise Induced  Know your asthma triggers: allergens             GREEN ZONE   Good Control    I feel good    No cough or wheeze    Can work, sleep and play without asthma symptoms       Take your asthma control medicine every day.     1. If exercise triggers your asthma, take your rescue medication    15 minutes before exercise or sports, and    During exercise if you have asthma symptoms  2. Spacer to use with inhaler: If you have a spacer, make sure to use it with your inhaler             YELLOW ZONE Getting Worse  I have ANY of these:    I do not feel good    Cough or wheeze    Chest feels tight    Wake up at night   1. Keep taking your Green Zone medications  2. Start taking your rescue medicine:    every 20  minutes for up to 1 hour. Then every 4 hours for 24-48 hours.  3. If you stay in the Yellow Zone for more than 12-24 hours, contact your doctor.  4. If you do not return to the Green Zone in 12-24 hours or you get worse, start taking your oral steroid medicine if prescribed by your provider.           RED ZONE Medical Alert - Get Help  I have ANY of these:    I feel awful    Medicine is not helping    Breathing getting harder    Trouble walking or talking    Nose opens wide to breathe       1. Take your rescue medicine NOW  2. If your provider has prescribed an oral steroid medicine, start taking it NOW  3. Call your doctor NOW  4. If you are still in the Red Zone after 20 minutes and you have not reached your doctor:    Take your rescue medicine again and    Call 911 or go to the emergency room right away    See your regular doctor within 2 weeks of an Emergency Room or Urgent Care visit for follow-up treatment.          Annual Reminders:  Meet with Asthma Educator,  Flu Shot in the Fall, consider Pneumonia Vaccination for patients with asthma (aged 19 and older).    Pharmacy:    [id number] [place], [place] - [address] [id number] [place], [place] - [address] [place] [address] & [address] [id number] [place], [place] - [address] AT [facility] OF  [address]    Electronically signed by MJ Wells CNP   Date: 01/14/21                    Asthma Triggers  How To Control Things That Make Your Asthma Worse    Triggers are things that make your asthma worse.  Look at the list below to help you find your triggers and   what you can do about them. You can help prevent asthma flare-ups by staying away from your triggers.      Trigger                                                          What you can do   Cigarette Smoke  Tobacco smoke can make asthma worse. Do not allow smoking in your home, car or around you.  Be sure no one smokes at a child s day care  or school.  If you smoke, ask your health care provider for ways to help you quit.  Ask family members to quit too.  Ask your health care provider for a referral to Quit Plan to help you quit smoking, or call 4-230-820-PLAN.     Colds, Flu, Bronchitis  These are common triggers of asthma. Wash your hands often.  Don t touch your eyes, nose or mouth.  Get a flu shot every year.     Dust Mites  These are tiny bugs that live in cloth or carpet. They are too small to see. Wash sheets and blankets in hot water every week.   Encase pillows and mattress in dust mite proof covers.  Avoid having carpet if you can. If you have carpet, vacuum weekly.   Use a dust mask and HEPA vacuum.   Pollen and Outdoor Mold  Some people are allergic to trees, grass, or weed pollen, or molds. Try to keep your windows closed.  Limit time out doors when pollen count is high.   Ask you health care provider about taking medicine during allergy season.     Animal Dander  Some people are allergic to skin flakes, urine or saliva from pets with fur or feathers. Keep pets with fur or feathers out of your home.    If you can t keep the pet outdoors, then keep the pet out of your bedroom.  Keep the bedroom door closed.  Keep pets off cloth furniture and away from stuffed toys.     Mice, Rats, and Cockroaches  Some people are allergic to the waste from these pests.   Cover food and garbage.  Clean up spills and food crumbs.  Store grease in the refrigerator.   Keep food out of the bedroom.   Indoor Mold  This can be a trigger if your home has high moisture. Fix leaking faucets, pipes, or other sources of water.   Clean moldy surfaces.  Dehumidify basement if it is damp and smelly.   Smoke, Strong Odors, and Sprays  These can reduce air quality. Stay away from strong odors and sprays, such as perfume, powder, hair spray, paints, smoke incense, paint, cleaning products, candles and new carpet.   Exercise or Sports  Some people with asthma have this  trigger. Be active!  Ask your doctor about taking medicine before sports or exercise to prevent symptoms.    Warm up for 5-10 minutes before and after sports or exercise.     Other Triggers of Asthma  Cold air:  Cover your nose and mouth with a scarf.  Sometimes laughing or crying can be a trigger.  Some medicines and food can trigger asthma.     Continue albuterol and loratadine, let Anjana know if your symptoms worsen or if you'd like to restart montelukast    For the yeast- fluconazole 150mg once weekly x4 weeks. You can try topical clotrimazole rather than nystatin as it may provide a better anti-inflammatory effect. Let Anjana know if this does not improve.    Epi pen refilled    Let Anjana know if you would like to discuss medication for anxiety/depression      MJ Wells CNP  [facility]  Family Practice  [facility]  [phone]    For any issues my office # is [phone]

## 2021-01-14 NOTE — PATIENT INSTRUCTIONS
Patient Education     De Quervain Tenosynovitis    De Quervain tenosynovitis is inflammation of tendons and synovium on the thumb side of the wrist. Tendons are fibers that attach muscle to bone. Synovium is a slick membrane that helps tendons move. Movements done over and over can irritate and inflame these tissues. This can cause pain when you touch or grasp objects, turn or twist your wrist, or make a fist. You may also have pain and swelling near the base of the thumb or numbness along the back of your thumb and index finger. You may also feel the thumb catch or snap when you move it.  Treatment will depend on how bad the symptoms are. It can often be treated with medicines, injections, splinting, and home care. If your case is severe, you may be referred to a specialist to talk about surgery.  Home care  Your healthcare provider may prescribe medicines to relieve pain and reduce inflammation. A steroid medicine may be injected near the tendons. This reduces swelling and pain. The healthcare provider may also suggest taking over-the-counter medicines such as ibuprofen or naproxen. These help reduce inflammation. Take all medicines only as directed.  The following are general care guidelines:    Avoid repetitive movements of your wrist and thumb.    Note any activity that causes pain or swelling. If possible, avoid or limit that activity.    Put a cold pack on your thumb. To make an ice pack, put ice cubes in a plastic bag that seals at the top. Wrap the bag in a clean, thin towel or cloth. Hold this to your thumb for up to 20 minutes at a time. Don't put ice directly on the skin.    Your healthcare provider may put a splint on the thumb to hold it still. Use the splint as you have been instructed. In some cases, you may need to use a splint 24 hours a day for 4 to 6 weeks. This will allow the wrist and thumb to heal.  Follow-up care  Follow up with your healthcare provider, or as advised. You may need  more treatment if your injury is severe or if your symptoms don't get better. This additional treatment may include local injections, physical therapy, and surgery.  When to seek medical advice  Call your healthcare provider right away if any of these occur:    Increase in pain or swelling    You have fever, chills, redness, warmth, or drainage    Symptoms get worse after taking medicine    Pain spreads farther down the thumb or into the forearm    Pain continues to get in the way of daily life  [employer] last reviewed this educational content on 6/1/2018 2000-2020 [employer]. [address] [place] [address]. All rights reserved. This information is not intended as a substitute for professional medical care. Always follow your healthcare professional's instructions.           Thumb spica, naproxen, heat/ice, and physical therapy for the wrist- be seen in person if this fails to improve over the next couple of weeks      My Asthma Action Plan    Name: Jackeline Yeboah   YOB: 1980  Date: 1/14/2021   My doctor: MJ Wells CNP   My clinic: [facility]        My Rescue Medicine:   Albuterol inhaler (Proair/Ventolin/Proventil HFA)  2-4 puffs EVERY 4 HOURS as needed. Use a spacer if recommended by your provider.   My Asthma Severity:   Intermittent / Exercise Induced  Know your asthma triggers: allergens             GREEN ZONE   Good Control    I feel good    No cough or wheeze    Can work, sleep and play without asthma symptoms       Take your asthma control medicine every day.     1. If exercise triggers your asthma, take your rescue medication    15 minutes before exercise or sports, and    During exercise if you have asthma symptoms  2. Spacer to use with inhaler: If you have a spacer, make sure to use it with your inhaler             YELLOW ZONE Getting Worse  I have ANY of these:    I do not feel good    Cough or wheeze    Chest feels tight    Wake up  at night   1. Keep taking your Green Zone medications  2. Start taking your rescue medicine:    every 20 minutes for up to 1 hour. Then every 4 hours for 24-48 hours.  3. If you stay in the Yellow Zone for more than 12-24 hours, contact your doctor.  4. If you do not return to the Green Zone in 12-24 hours or you get worse, start taking your oral steroid medicine if prescribed by your provider.           RED ZONE Medical Alert - Get Help  I have ANY of these:    I feel awful    Medicine is not helping    Breathing getting harder    Trouble walking or talking    Nose opens wide to breathe       1. Take your rescue medicine NOW  2. If your provider has prescribed an oral steroid medicine, start taking it NOW  3. Call your doctor NOW  4. If you are still in the Red Zone after 20 minutes and you have not reached your doctor:    Take your rescue medicine again and    Call 911 or go to the emergency room right away    See your regular doctor within 2 weeks of an Emergency Room or Urgent Care visit for follow-up treatment.          Annual Reminders:  Meet with Asthma Educator,  Flu Shot in the Fall, consider Pneumonia Vaccination for patients with asthma (aged 19 and older).    Pharmacy:    [id number] [place], [place] - [address] [id number] [place], [place] - [address] [place] [address] & [address] [id number] [place], [place] - [address] AT [facility] OF  [address]    Electronically signed by MJ Wells CNP   Date: 01/14/21                    Asthma Triggers  How To Control Things That Make Your Asthma Worse    Triggers are things that make your asthma worse.  Look at the list below to help you find your triggers and   what you can do about them. You can help prevent asthma flare-ups by staying away from your triggers.      Trigger                                                          What you can do   Cigarette Smoke  Tobacco smoke can make asthma  worse. Do not allow smoking in your home, car or around you.  Be sure no one smokes at a child s day care or school.  If you smoke, ask your health care provider for ways to help you quit.  Ask family members to quit too.  Ask your health care provider for a referral to Quit Plan to help you quit smoking, or call 9-894-048-PLAN.     Colds, Flu, Bronchitis  These are common triggers of asthma. Wash your hands often.  Don t touch your eyes, nose or mouth.  Get a flu shot every year.     Dust Mites  These are tiny bugs that live in cloth or carpet. They are too small to see. Wash sheets and blankets in hot water every week.   Encase pillows and mattress in dust mite proof covers.  Avoid having carpet if you can. If you have carpet, vacuum weekly.   Use a dust mask and HEPA vacuum.   Pollen and Outdoor Mold  Some people are allergic to trees, grass, or weed pollen, or molds. Try to keep your windows closed.  Limit time out doors when pollen count is high.   Ask you health care provider about taking medicine during allergy season.     Animal Dander  Some people are allergic to skin flakes, urine or saliva from pets with fur or feathers. Keep pets with fur or feathers out of your home.    If you can t keep the pet outdoors, then keep the pet out of your bedroom.  Keep the bedroom door closed.  Keep pets off cloth furniture and away from stuffed toys.     Mice, Rats, and Cockroaches  Some people are allergic to the waste from these pests.   Cover food and garbage.  Clean up spills and food crumbs.  Store grease in the refrigerator.   Keep food out of the bedroom.   Indoor Mold  This can be a trigger if your home has high moisture. Fix leaking faucets, pipes, or other sources of water.   Clean moldy surfaces.  Dehumidify basement if it is damp and smelly.   Smoke, Strong Odors, and Sprays  These can reduce air quality. Stay away from strong odors and sprays, such as perfume, powder, hair spray, paints, smoke incense, paint,  cleaning products, candles and new carpet.   Exercise or Sports  Some people with asthma have this trigger. Be active!  Ask your doctor about taking medicine before sports or exercise to prevent symptoms.    Warm up for 5-10 minutes before and after sports or exercise.     Other Triggers of Asthma  Cold air:  Cover your nose and mouth with a scarf.  Sometimes laughing or crying can be a trigger.  Some medicines and food can trigger asthma.     Continue albuterol and loratadine, let Anjana know if your symptoms worsen or if you'd like to restart montelukast    For the yeast- fluconazole 150mg once weekly x4 weeks. You can try topical clotrimazole rather than nystatin as it may provide a better anti-inflammatory effect. Let Anjana know if this does not improve.    Epi pen refilled    Let Anjana know if you would like to discuss medication for anxiety/depression

## 2021-01-15 ASSESSMENT — ANXIETY QUESTIONNAIRES: GAD7 TOTAL SCORE: 8

## 2021-01-15 ASSESSMENT — ASTHMA QUESTIONNAIRES: ACT_TOTALSCORE: 21

## 2021-01-19 ENCOUNTER — VIRTUAL VISIT (OUTPATIENT)
Dept: SLEEP MEDICINE | Facility: CLINIC | Age: 41
End: 2021-01-19
Payer: COMMERCIAL

## 2021-01-19 DIAGNOSIS — G47.33 OSA (OBSTRUCTIVE SLEEP APNEA): ICD-10-CM

## 2021-01-19 DIAGNOSIS — G47.50 PARASOMNIA, UNSPECIFIED TYPE: Primary | ICD-10-CM

## 2021-01-19 PROCEDURE — 99215 OFFICE O/P EST HI 40 MIN: CPT | Mod: TEL | Performed by: PHYSICIAN ASSISTANT

## 2021-01-19 NOTE — Clinical Note
[employer], this is another patient that I would love to discuss with you when you get a chance!

## 2021-01-19 NOTE — PROGRESS NOTES
Received record of previous sleep study completed 5/27/2009 at [facility], summarized findings as follows: AHI 5.6, RDI 26. Lowest oxygen saturation 92%. PLM Index 2.0. All sleep stages present. Patient weight 157lbs at time of study. Marilee Rae PA-C on 1/19/2021 at 4:08 PM

## 2021-01-19 NOTE — PROGRESS NOTES
Jackeline is a 40 year old who is being evaluated via a billable telephone visit.      What phone number would you like to be contacted at? [phone]  How would you like to obtain your AVS? Pat    Phone call start time: 1:30PM      [facility]   Outpatient Sleep Medicine  January 19, 2021      Name: Jackeline Yeboah MRN# [id number]   Age: 40 year old YOB: 1980            Assessment and Plan:   1. Parasomnia, unspecified type  2. YONATAN (obstructive sleep apnea)    Patient presents to clinic today to discuss a roughly 4 week history of nighttime movements in her sleep that have resulted in the patient waking with bruises. Patient reports these movements appear to be purposeful which makes me question RBD but this is somewhat unclear as patient has no recollection of these events and history is provided by what she notices on video recording of herself under covers. No clear precipitating/predisoposing factor. Sleep apnea is reportedly well treated/controlled on her CPAP with reported AHI <1 (no download available for me today, this reading is per patient). Discussed having patient come in for in-lab polysomnography as to better characterize her movements and help determine etiology but patient politely declined this at this time, does not feel comfortable coming in for study due to COVID pandemic.     We thus spent the majority of the visit today discussing how to limit injury to herself and keeping bed/bedroom environment safe. Will remove any potentially dangerous objects from the bedroom. Discussed padding furniture near bed/moving night tables, lamps, etc out of bedroom and/or out of reach. Will continue to keep consistent sleep schedule with 7+ hours of sleep per night. Will also start melatonin 6mg with titration up to 12mg as needed. If movements are responsive to high dose melatonin would be more suspicious of  RBD.     Jackeline Yeboah will follow up in about 1-2 months,  "but encouraged her to come in sooner if needed or if not responding to melatonin.    I also asked the patient to call her previous sleep center and request the records of her previous sleep study be faxed/sent over to us. This was requested at her last visit but we have not received record.        Chief Complaint      Chief Complaint   Patient presents with     Sleep Problem     Sleep movements             History of Present Illness:     Jackeline Yeboah is a 40 year old female who presents to the clinic for evaluation of approximately 4 week history of nocturnal movements. Patient states since mid-December has been doing \"all kinds of excessive moving\" in her sleep.  Bought a night vision camera for her bedroom after she was waking up with bruises from unknown source. Bruises have be located on all parts of body - have been on back, arm, leg, chest, \"all over\". Reports she also sprained her wrist which she suspects happened in her sleep somehow a few weeks ago, PCP note from 1/14/2021 diagnosed her with DeQuervian's. Has been recording herself sleep every night the past couple weeks. Reports movements every night \"4-6 times\". On the video each night she reports she is doing \"a lot of motion and moving like I am having a party in my bed its sometimes chaotic or one time it looked like I was doing yoga almost or like making a pyramid under my covers but with the covers there I don't know really\". Some nights appears to be movements in her legs only but can also sometimes move her arms/rest of body. Does not have a bed partner. History of sleep walking x3 in lifetime, most recent episode \"years ago\", video does not show her leaving the bed or attempt to leave her bed. She states \"It looks like I am acting something out or sometimes the videos show that I am talking too but I don't know what about\". Reports that the movements she makes appear to me purposeful. Wakes with no recollection of dreaming/any content or these " "episodes. Was only made aware of these movements because she woke with bruises. These movements occur at any time of night, there is a time stamp on her video that shows the movements start around 3-4AM and can continue up until she wakes up but \"not as much towards the end of my sleep\".     Current sleep schedule includes bedtime 2:00AM and wake time 9-10:00AM. Sleep schedule is the same on weekends and weekdays. Denies any change in sleep schedule or any sleep deprivation.  Sleeping at least 7 hours per night.     Does not drink alcohol. No new medications or change in medications around time of onset. Denies any physical or emotional stressors -\"no life changes or anything no its all the same\". No recent illnesses.     Continues to use her CPAP. No download information for me to review today (patient did not answer any pre-charting calls) but she reports 100% compliance. Patient reports her AHI to be <1 the past few weeks, when on telephone took SD card out of machine and downloaded to her computer.     Past medical/surgical history, family history, social history, medications and allergies were reviewed.           Physical Examination:   There were no vitals taken for this visit.  General: Cooperative and pleasant. In no apparent distress.  Pulmonary:  Able to speak easily in full sentences. No cough or wheeze.   Neurologic: Alert, oriented x3.   Psychiatric: Mood euthymic. Affect congruent with full range and intensity.    CC:  Anjana Peng PA-C  Jan 19, 2021     [facility]  [address] [place] [address]     [facility] [place]  [address] [address]    Phone call end time: 1:56PM  Phone call duration: 25:08 minutes    60 minutes spent on day of encounter doing chart review, history and exam, documentation, and further activities as noted above  "

## 2021-01-19 NOTE — PROGRESS NOTES
1-2 month Telephone visit scheduled 3/3/21.     Mariann GARCÍA CMA, [facility], 1/19/2021 3:29 PM

## 2021-02-18 ENCOUNTER — MEDICAL CORRESPONDENCE (OUTPATIENT)
Dept: FAMILY MEDICINE | Facility: CLINIC | Age: 41
End: 2021-02-18

## 2021-02-25 ENCOUNTER — OFFICE VISIT (OUTPATIENT)
Dept: FAMILY MEDICINE | Facility: CLINIC | Age: 41
End: 2021-02-25

## 2021-02-25 VITALS
TEMPERATURE: 97.4 F | WEIGHT: 213 LBS | HEIGHT: 64 IN | OXYGEN SATURATION: 98 % | SYSTOLIC BLOOD PRESSURE: 128 MMHG | DIASTOLIC BLOOD PRESSURE: 78 MMHG | BODY MASS INDEX: 36.37 KG/M2 | HEART RATE: 117 BPM | RESPIRATION RATE: 16 BRPM

## 2021-02-25 DIAGNOSIS — D35.2 PITUITARY ADENOMA (H): ICD-10-CM

## 2021-02-25 DIAGNOSIS — M25.531 RIGHT WRIST PAIN: Primary | ICD-10-CM

## 2021-02-25 PROCEDURE — 99214 OFFICE O/P EST MOD 30 MIN: CPT | Performed by: NURSE PRACTITIONER

## 2021-02-25 PROCEDURE — 73110 X-RAY EXAM OF WRIST: CPT | Mod: FY | Performed by: NURSE PRACTITIONER

## 2021-02-25 RX ORDER — LANOLIN ALCOHOL/MO/W.PET/CERES
1 CREAM (GRAM) TOPICAL
COMMUNITY

## 2021-02-25 ASSESSMENT — MIFFLIN-ST. JEOR: SCORE: 1626.16

## 2021-02-25 NOTE — PROGRESS NOTES
"Problem(s) Oriented visit        SUBJECTIVE:                                                    Jackeline Yeboah is a 40 year old female who presents to clinic today for the following health issues :    CC: R wrist/forearm pain    Wrist pain: R wrist and forearm pain with certain movements such as opening a bag of chips and twisting the wrist, present since December 2020 when she woke up with an unknown injury. Tentative dx of Dequervain's tenosynovitis but was only able to be evaluated over video. Since then, pain has been stable but not improving. Magnesium soaks, CBD creams with Arnica, Aleve once daily, thumb spica splint- wears at night and then throughout the day on and off. Would like to do physical therapy. No significant numbness, tingling, or weakness. No erythema, edema, or warmth.    Pituitary adenoma: Noted in 2017, overdue for endocrinology follow up. She is concerned that things may be worsening and that she may have Cushings disease- sweating more than normal, fatty deposits in her upper back, hirsutism (chronic). Does have issues with migraines associated with nausea and diarrhea, states this has been stable. No vomiting, vision changes, syncope/presyncope.      Problem list, Medication list, Allergies, and Medical/Social/Surgical histories reviewed in [facility] and updated as appropriate.   Additional history: as documented    ROS:  Constitutional  Head and neck  Cardiovascular  Abdominal  Musculoskeletal  Neurological  endocrine  Negative except as listed per HPI    OBJECTIVE:                                                    Physical Exam:    /78   Pulse 117   Temp 97.4  F (36.3  C)   Resp 16   Ht 1.626 m (5' 4\")   Wt 96.6 kg (213 lb)   LMP 01/28/2021 (Approximate)   SpO2 98%   BMI 36.56 kg/m      CONSTITUTIONAL: Alert non-toxic appearing female in no acute distress  RESPIRATORY: Lungs clear to auscultation, respirations unlabored  CV: Regular rate and rhythm, S1S2, no clicks, murmurs, " rubs, or gallops  GASTROINTESTINAL: Normoactive bowel sounds x4 quadrants, abdomen soft, non-distended, and non-tender to palpation  MSK: 5/5 strength in L wrist, 4/5 strength in R wrist. Positive R finkelstein's test. Negative Phalen/Tinel. Tender along forearm. No erythema, edema, or excessive warmth. Pain with resisted R pronation.  NEUROLOGIC: No gross deficits  PSYCHIATRIC: Pleasant and interactive, affect euthymic, makes appropriate eye contact, thought process logical       ASSESSMENT/PLAN:                                                          Jackeline was seen today for musculoskeletal problem and perspiration.    Diagnoses and all orders for this visit:    Right wrist pain  -     Cancel: XR Wrist Right G/E 3 Views; Future  -     XR Wrist Right G/E 3 Views    No obvious fracture per my read, await radiology results. Discussed splint, heat, increasing naproxen to BID, avoiding aggravating activities. Would like to see PT (previously referred). Discussed steroid injection which she declines.    Pituitary adenoma (H)  -     TSH ([facility])  -     Prolactin ([facility])  -     Lipid Panel ([facility])  -     Comp. Metabolic Panel (14) ([facility])  -     ENDOCRINOLOGY ADULT REFERRAL    Concern for Cushing's disease given pituitary adenoma and her symptoms. She has not followed up with endocrinology in at least a year and would like to establish with a new endocrinologist. Referral placed, reviewed importance of endocrine follow up. Reviewed s/sxs emergency and when to seek further care.              The following health maintenance items are reviewed in Epic and correct as of today:  Health Maintenance   Topic Date Due     ADVANCE CARE PLANNING  1980     Pneumococcal Vaccine: Pediatrics (0 to 5 Years) and At-Risk Patients (6 to 64 Years) (1 of 2 - PPSV23) 03/27/1987     HIV SCREENING  03/27/1996     HEPATITIS C SCREENING  03/27/1999     DTAP/TDAP/TD IMMUNIZATION (4 - Tdap) 10/07/2014     PAP  06/30/2018      MEDICARE ANNUAL WELLNESS VISIT  07/03/2020     INFLUENZA VACCINE (1) 09/01/2020     ASTHMA CONTROL TEST  07/14/2021     PHQ-9  07/14/2021     ASTHMA ACTION PLAN  01/14/2022     DEPRESSION ACTION PLAN  Completed     IPV IMMUNIZATION  Completed     MENINGITIS IMMUNIZATION  Aged Out     HEPATITIS B IMMUNIZATION  Aged Out       Patient Instructions   Vaccine connector via [facility]    Aleve twice daily, use the splint as much as possible, avoid irritating activities, moist heat four times daily, PT- if this doesn't help, let us know and we can consider a steroid injection    Labs today given the history of pituitary adenoma, please follow up with endocrinology as soon as possible        MJ Wells CNP  [facility]  [facility]  [facility]  [phone]    For any issues my office # is [phone]

## 2021-02-25 NOTE — PATIENT INSTRUCTIONS
Vaccine connector via [facility]    Aleve twice daily, use the splint as much as possible, avoid irritating activities, moist heat four times daily, PT- if this doesn't help, let us know and we can consider a steroid injection    Labs today given the history of pituitary adenoma, please follow up with endocrinology as soon as possible

## 2021-02-26 LAB
PROLACTIN: 29.2 NG/ML (ref 4.8–23.3)
TSH BLD-ACNC: 6.76 UIU/ML (ref 0.45–4.5)

## 2021-02-27 DIAGNOSIS — E03.8 SUBCLINICAL HYPOTHYROIDISM: Primary | ICD-10-CM

## 2021-03-02 LAB — T4 FREE SERPL-MCNC: 1.24 NG/DL (ref 0.82–1.77)

## 2021-03-03 ENCOUNTER — VIRTUAL VISIT (OUTPATIENT)
Dept: SLEEP MEDICINE | Facility: CLINIC | Age: 41
End: 2021-03-03
Payer: COMMERCIAL

## 2021-03-03 VITALS — WEIGHT: 215 LBS | BODY MASS INDEX: 36.7 KG/M2 | HEIGHT: 64 IN

## 2021-03-03 DIAGNOSIS — G47.33 OSA (OBSTRUCTIVE SLEEP APNEA): ICD-10-CM

## 2021-03-03 DIAGNOSIS — G47.50 PARASOMNIA, UNSPECIFIED TYPE: Primary | ICD-10-CM

## 2021-03-03 PROBLEM — E66.01 MORBID OBESITY (H): Status: ACTIVE | Noted: 2021-03-03

## 2021-03-03 PROCEDURE — 99214 OFFICE O/P EST MOD 30 MIN: CPT | Mod: 95 | Performed by: PHYSICIAN ASSISTANT

## 2021-03-03 ASSESSMENT — MIFFLIN-ST. JEOR: SCORE: 1635.23

## 2021-03-03 NOTE — PROGRESS NOTES
"Jackeline Yeboah is a 40 year old female being evaluated via a billable telephone visit.     \"This telephone visit will be conducted via a call between you and your physician/provider. We have found that certain health care needs can be provided without the need for an in-person visit or physical exam.  This service lets us provide the care you need with a telephone conversation.  If a prescription is necessary we can send it directly to your pharmacy.  If lab work is needed we can place an order for that and you can then stop by our lab to have the test done at a later time.\"    Telephone visits are billed at different rates depending on your insurance coverage.  Please reach out to your insurance provider with any questions.    Patient has given verbal consent for  a Telephone visit? Yes    What telephone number would you like your provider to contact at at:  [phone]     How would you like to obtain your AVS? Pat Sepulveda [facility]    Telephone Visit Details:     Telephone Visit Start Time: 12:33 PM    [facility]   Outpatient Sleep Medicine  March 3, 2021      Name: Jackeline Yeboah MRN# [id number]   Age: 40 year old YOB: 1980            Assessment and Plan:   1. Parasomnia, unspecified type  2. YONATAN (obstructive sleep apnea)  Patient's nighttime movements have improved since starting melatonin at night.  We will continue nightly melatonin 5 mg.  Discussed that she could consider increasing as tolerated to see if this helps suppress movements anymore.  To help better characterize her movements and help determine etiology orders were placed today for in lab polysomnogram. Patient would like to wait to schedule this until summer/fall due to COVID-19 concerns, \"I want to wait until more vaccines are out and stuff\". We will plan to have her complete the sleep study with her CPAP at current pressures APAP [id number] but will titrate if needed.  In the meantime she will " "continue to keep a safe sleeping environment and avoid sleep deprivation.  She will continue nightly CPAP use.  Prescription was sent for new mask/supplies today per patient request.  - Comprehensive Sleep Study; Future  - Comprehensive DME    Follow-up after sleep study to discuss results, or sooner as needed.         Chief Complaint      Chief Complaint   Patient presents with     Follow Up     to discuss sleep medication            History of Present Illness:     Jackeline Yeboah is a 40 year old female who presents to the clinic for follow-up of nocturnal movements. Patient presented in January 2021 with a 4 week history of what appeared to be purposeful movements in her sleep. Was waking with bruises. Bought a video for her room and described the movements as \"a lot of motion and moving like I am having a party in my bed its sometimes chaotic or one time it looked like I was doing yoga almost or like making a pyramid under my covers but with the covers there I don't know really\". At last visit discussed starting melatonin to see if this would suppress some of the movements. Melatonin has been helpful. Started at 3mg and has titrated up to 5mg. Feels that melatonin \"increased my REM or intensifies my REM sleep because my dreams are longer than normal\". When she first started melatonin felt grogginess in AM and that her \"body shut down in a not good way\" but is tolerating now just fine. No new bruises or injuries since starting nightly melatonin. Still has movements in her sleep that she notes begin after 3-4 hours of sleep but are \"less purposeful movements and the amount of motion is less\". Some are still \"grandiose movements like I point my legs up to the ceiling and hang out there a while and then come back down but the excessive periods of what was happening previously are less\".     No other changes other than starting the melatonin at night. Sleep schedule is consistent and unchanged from last visit. " "Continues nightly CPAP use. No download today.     Past medical/surgical history, family history, social history, medications and allergies were reviewed.           Physical Examination:   Ht 1.626 m (5' 4\")   Wt 97.5 kg (215 lb)   BMI 36.90 kg/m    General: Cooperative and pleasant. In no apparent distress.  Pulmonary:  Able to speak easily in full sentences. No cough or wheeze.   Neurologic: Alert, oriented x3.   Psychiatric: Mood euthymic. Affect congruent with full range and intensity.      Marilee Rae PA-C  Mar 3, 2021     [facility] [place]  3rd Floor  [address] , [place] [address]     [facility] [place]  [address] [place] [id number]    Chart documentation was completed, in part, with [employer] voice-recognition software. Even though reviewed, some grammatical, spelling, and word errors may remain.      Telephone Visit End Time:12:47 PM    34 minutes spent on day of encounter doing chart review, history and exam, documentation, and further activities as noted above      "

## 2021-03-05 NOTE — PROGRESS NOTES
Hand Therapy Initial Evaluation  Current Date:  3/9/2021    Diagnosis: Right Wrist Pain   DOI: MD order 1/14/2021, Onset 12/19/2020  Post:  > 2 months    Precautions: N/A    Subjective:  Jackeline Yeboah is a 40 year old female.    Patient reports symptoms of the right wrist which occurred due to unsure, possibly due to sleep activity. Since onset symptoms are Gradually getting better.  General health as reported by patient is fair.  Pertinent medical history includes:Asthma  Medical allergies:latex.  Surgical history: none.  Medication history: anti-inflammatory, pain medication, sleep medication.    Current occupation is   Job Tasks: Repetitive Tasks    Occupational Profile Information:  Right hand dominant  Prior functional level:  no limitations  Patient reports symptoms of pain, stiffness/loss of motion and weakness/loss of strength  Special tests:  x-ray (neg).    Previous treatment: OTC FABTSS, aleve, heat, CBD/Arnica  Barriers include:none  Mobility: No difficulty  Transportation: drives  Currently not working until April  Leisure activities/hobbies: biking,   Other: Lives with an adult, available to help as needed    Functional Outcome Measure:   Upper Extremity Functional Index Score:  SCORE:   Column Totals: /80: (P) 54   (A lower score indicates greater disability.)    Objective:  Pain Level (Scale 0-10):   3/9/2021   At Rest 4   With Use 4-9     Pain Description:  Date 3/9/2021   Location Right Radial Wrist   Pain Quality Sore at rest, like a bad bruise   Frequency intermittent, constant or daily     Pain is worst daytime, can be bothersome when trying to fall asleep   Exacerbated by Force into ulnar deviation, opening jars,    Relieved by Rest, splint, CBD/Arnica   Progression Gradually improving     Sensation   Patient reports numbness/tingling in dorsal wrist with computer use    Edema   - none  + mild    ++ moderate    +++ severe    3/9/2021   1st DC -   Radial Styloid -      ROM  Pain  Report: - none  + mild    ++ moderate    +++ severe   Thumb 3/9/2021 3/9/2021   AROM (PROM) Left Right   MP -10/52 -15/38   IP 0/77 0/64   RABD 55 45   PABD 50 42     Wrist 3/9/2021 3/9/2021   AROM (PROM) Left Right   Extension 60 40   Flexion 80 65   RD 25 20   UD 40 15   UD with Th Flex 20- -10+     Resisted Testing  Pain Report: - none  + mild    ++ moderate    +++ severe    3/9/2021   APL -   EPB +   EPL +   FCR -     Strength   (Measured in pounds)  Pain Report: - none  + mild    ++ moderate    +++ severe    3/9/2021 3/9/2021   Trials Left Right   1  2  3 74 41 to pain   Average 74 41     Lat Pinch 3/9/2021 3/9/2021   Trials Left Right   1  2  3 18 12-   Average 18 12     3 Pt Pinch 3/9/2021 3/9/2021   Trials Left Right   1  2  3 16 14-   Average 16 14     Special Tests   Pain Report:  - none    + mild    ++ moderate    +++ severe    3/9/2021   Finkelsteins ++   Radial Nerve Tinel's (DRSN) -   Th CMC Grind -   Th CMC Passive Retropulsion -     Palpation  Pain Report:  - none    + mild    ++ moderate    +++ severe    3/9/2021   Radial Styloid ++   1st DC ++   FCR -   Thumb CMC -   PIN Site -   Extensor Wad -     Assessment:  Patient presents with symptoms consistent with diagnosis of right wrist pain, with conservative intervention. Per therapist evaluation, patient is having symptoms of Dequervain's tendonitis.    Patient's limitations or Problem List includes:  Pain, Decreased ROM/motion, Weakness, Decreased stability, Decreased , Decreased coordination, Decreased dexterity and Tightness in musculature of the right wrist and thumb which interferes with the patient's ability to perform Self Care Tasks (dressing, eating, bathing, hygiene/toileting), Work Tasks, Sleep Patterns, Recreational Activities, Household Chores and Driving  as compared to previous level of function.    Rehab Potential:  Excellent - Return to full activity, no limitations    Patient will benefit from skilled Occupational Therapy to  increase ROM,  strength, stability of wrist, coordination and dexterity and decrease pain to return to previous activity level and resume normal daily tasks and to reach their rehab potential.    Barriers to Learning:  No barrier    Communication Issues:  Patient appears to be able to clearly communicate and understand verbal and written communication and follow directions correctly.    Chart Review: Chart Review and Detailed history review with patient    Identified Performance Deficits: dressing, hygiene and grooming, home establishment and management, meal preparation and cleanup, work and leisure activities    Assessment of Occupational Performance:  5 or more Performance Deficits    Clinical Decision Making (Complexity): Low complexity    Treatment Explanation:  The following has been discussed with the patient:  RX ordered/plan of care  Anticipated outcomes  Possible risks and side effects    Plan:  Frequency:  1 X week, once daily  Duration:  for 12 weeks    Treatment Plan:    Modalities:    US and Laser Light   Therapeutic Exercise:    AROM, AAROM, PROM, Tendon Gliding, Isotonics, Isometrics and Stabilization  Neuromuscular re-ed:   Nerve Gliding and Kinesiotaping  Manual Techniques:   Joint mobilization, Friction massage and Myofascial release  Orthotic Fabrication:    Forearm based orthosis  Self Care:    Self Care Tasks and Work Tasks    Discharge Plan:  Achieve all LTG.  Independent in home treatment program.  Reach maximal therapeutic benefit.    Discharge Plan:    Achieve all LTG.  Independent in home treatment program.  Reach maximal therapeutic benefit.    Home Exercise Program:  OT RAMILAS  K-tape trial (DeQuervain's)  FM to 1st dorsal compartment  STM to FA flexors/extensors  Wrist AROM - ext/flex and RD/UD  Ice    Next Visit:  Check splint, fabricate custom as indicated  Check response to K-tape  Laser  STM/FM  Add thumb tendon gliding  Future visits: PROM, strengthening

## 2021-03-09 ENCOUNTER — THERAPY VISIT (OUTPATIENT)
Dept: OCCUPATIONAL THERAPY | Facility: CLINIC | Age: 41
End: 2021-03-09
Payer: COMMERCIAL

## 2021-03-09 DIAGNOSIS — M25.531 RIGHT WRIST PAIN: ICD-10-CM

## 2021-03-09 PROCEDURE — 97140 MANUAL THERAPY 1/> REGIONS: CPT | Mod: GO | Performed by: OCCUPATIONAL THERAPIST

## 2021-03-09 PROCEDURE — 97026 INFRARED THERAPY: CPT | Mod: GO | Performed by: OCCUPATIONAL THERAPIST

## 2021-03-09 PROCEDURE — 97110 THERAPEUTIC EXERCISES: CPT | Mod: GO | Performed by: OCCUPATIONAL THERAPIST

## 2021-03-09 PROCEDURE — 97165 OT EVAL LOW COMPLEX 30 MIN: CPT | Mod: GO | Performed by: OCCUPATIONAL THERAPIST

## 2021-03-11 NOTE — PROGRESS NOTES
SOAP Note Objective Information for 3/16/2021:    Pain Level (Scale 0-10):   3/9/2021 3/16/21   At Rest 4 4   With Use 4-9 8     Pain Description:  Date 3/9/2021   Location Right Radial Wrist   Pain Quality Sore at rest, like a bad bruise   Frequency intermittent, constant or daily     Pain is worst daytime, can be bothersome when trying to fall asleep   Exacerbated by Force into ulnar deviation, opening jars,    Relieved by Rest, splint, CBD/Arnica   Progression Gradually improving     ROM  Pain Report: - none  + mild    ++ moderate    +++ severe   Thumb 3/9/2021 3/9/2021 3/16/21   AROM (PROM) Left Right Right   MP -10/52 -15/38 0/45   IP 0/77 0/64 0/73   RABD 55 45 50   PABD 50 42 50     Wrist 3/9/2021 3/9/2021 3/16/21   AROM (PROM) Left Right Right   Extension 60 40 47   Flexion 80 65 71   RD 25 20 20   UD 40 15 25   UD with Th Flex 20- -10+ -5     Home Exercise Program:  OT FABANN-MARIE  Custom [place]  K-tape (DeQuervain's)  FM to 1st dorsal compartment  STM to FA flexors/extensors  Wrist AROM - ext/flex and RD/UD  Dequervain stretch  Ice    Next Visit:  Check splint  Laser  STM/FM  Add wrist PROM  Future visits:strengthening    Please refer to the daily flowsheet for treatment today, total treatment time and time spent performing 1:1 timed codes.

## 2021-03-16 ENCOUNTER — THERAPY VISIT (OUTPATIENT)
Dept: OCCUPATIONAL THERAPY | Facility: CLINIC | Age: 41
End: 2021-03-16
Attending: NURSE PRACTITIONER
Payer: COMMERCIAL

## 2021-03-16 DIAGNOSIS — M65.4 DE QUERVAIN'S DISEASE (TENOSYNOVITIS): ICD-10-CM

## 2021-03-16 DIAGNOSIS — M25.531 RIGHT WRIST PAIN: ICD-10-CM

## 2021-03-16 PROCEDURE — 97140 MANUAL THERAPY 1/> REGIONS: CPT | Mod: GO | Performed by: OCCUPATIONAL THERAPIST

## 2021-03-16 PROCEDURE — 97026 INFRARED THERAPY: CPT | Mod: GO | Performed by: OCCUPATIONAL THERAPIST

## 2021-03-16 PROCEDURE — 97110 THERAPEUTIC EXERCISES: CPT | Mod: GO | Performed by: OCCUPATIONAL THERAPIST

## 2021-03-16 PROCEDURE — 97760 ORTHOTIC MGMT&TRAING 1ST ENC: CPT | Mod: GO | Performed by: OCCUPATIONAL THERAPIST

## 2021-03-18 NOTE — PROGRESS NOTES
SOAP Note Objective Information for 3/23/2021:    Pain Level (Scale 0-10):   3/9/2021 3/16/21 3/23/21   At Rest 4 4 3-4   With Use 4-9 8 7-8     Pain Description:  Date 3/9/2021   Location Right Radial Wrist   Pain Quality Sore at rest, like a bad bruise   Frequency intermittent, constant or daily     Pain is worst daytime, can be bothersome when trying to fall asleep   Exacerbated by Force into ulnar deviation, opening jars,    Relieved by Rest, splint, CBD/Arnica   Progression Gradually improving     ROM  Pain Report: - none  + mild    ++ moderate    +++ severe   Thumb 3/9/2021 3/9/2021 3/16/21   AROM (PROM) Left Right Right   MP -10/52 -15/38 0/45   IP 0/77 0/64 0/73   RABD 55 45 50   PABD 50 42 50     Wrist 3/9/2021 3/9/2021 3/16/21 3/23/21   AROM (PROM) Left Right Right Right   Extension 60 40 47 50   Flexion 80 65 71 80   RD 25 20 20 25   UD 40 15 25 30   UD with Th Flex 20- -10+ -5 10+     Home Exercise Program:  OTC RAMILAS  Custom Ott[place]ock  K-tape (DeQuervain's)  FM to 1st dorsal compartment  STM to FA flexors/extensors  Wrist AROM - ext/flex and RD/UD  Dequervain stretch  Ice    Next Visit:  Check splint  Laser  STM/FM  Add wrist PROM   Future visits:strengthening    Please refer to the daily flowsheet for treatment today, total treatment time and time spent performing 1:1 timed codes.

## 2021-03-23 ENCOUNTER — MEDICAL CORRESPONDENCE (OUTPATIENT)
Dept: FAMILY MEDICINE | Facility: CLINIC | Age: 41
End: 2021-03-23

## 2021-03-23 ENCOUNTER — THERAPY VISIT (OUTPATIENT)
Dept: OCCUPATIONAL THERAPY | Facility: CLINIC | Age: 41
End: 2021-03-23
Payer: COMMERCIAL

## 2021-03-23 DIAGNOSIS — M25.531 RIGHT WRIST PAIN: ICD-10-CM

## 2021-03-23 PROCEDURE — 97026 INFRARED THERAPY: CPT | Mod: GO | Performed by: OCCUPATIONAL THERAPIST

## 2021-03-23 PROCEDURE — 97110 THERAPEUTIC EXERCISES: CPT | Mod: GO | Performed by: OCCUPATIONAL THERAPIST

## 2021-03-23 PROCEDURE — 97140 MANUAL THERAPY 1/> REGIONS: CPT | Mod: GO | Performed by: OCCUPATIONAL THERAPIST

## 2021-03-26 NOTE — PROGRESS NOTES
SOAP Note Objective Information for 3/30/2021:    Pain Level (Scale 0-10):   3/9/2021 3/16/21 3/23/21 3/30/21   At Rest 4 4 3-4 3   With Use 4-9 8 7-8 5     Pain Description:  Date 3/9/2021   Location Right Radial Wrist   Pain Quality Sore at rest, like a bad bruise   Frequency intermittent, constant or daily     Pain is worst daytime, can be bothersome when trying to fall asleep   Exacerbated by Force into ulnar deviation, opening jars,    Relieved by Rest, splint, CBD/Arnica   Progression Gradually improving     ROM  Pain Report: - none  + mild    ++ moderate    +++ severe   Thumb 3/9/2021 3/9/2021 3/16/21   AROM (PROM) Left Right Right   MP -10/52 -15/38 0/45   IP 0/77 0/64 0/73   RABD 55 45 50   PABD 50 42 50     Wrist 3/9/2021 3/9/2021 3/16/21 3/23/21 3/30/21   AROM (PROM) Left Right Right Right Right   Extension 60 40 47 50 58   Flexion 80 65 71 80 WNL   RD 25 20 20 25 WNL   UD 40 15 25 30 33   UD with Th Flex 20- -10+ -5 10+ 15+     Home Exercise Program:  OTC TRISTON  Custom [place]ock  K-tape (DeQuervain's)  FM to 1st dorsal compartment  STM to FA flexors/extensors  Wrist AROM - ext/flex and RD/UD  Dequervain stretch  PROM wrist extension  Ice    Next Visit:  Laser  STM/FM  Check response wrist PROM   Future visits:strengthening    Please refer to the daily flowsheet for treatment today, total treatment time and time spent performing 1:1 timed codes.

## 2021-03-30 ENCOUNTER — THERAPY VISIT (OUTPATIENT)
Dept: OCCUPATIONAL THERAPY | Facility: CLINIC | Age: 41
End: 2021-03-30
Payer: COMMERCIAL

## 2021-03-30 DIAGNOSIS — M25.531 RIGHT WRIST PAIN: ICD-10-CM

## 2021-03-30 PROCEDURE — 97110 THERAPEUTIC EXERCISES: CPT | Mod: GO | Performed by: OCCUPATIONAL THERAPIST

## 2021-03-30 PROCEDURE — 97140 MANUAL THERAPY 1/> REGIONS: CPT | Mod: GO | Performed by: OCCUPATIONAL THERAPIST

## 2021-03-30 PROCEDURE — 97026 INFRARED THERAPY: CPT | Mod: GO | Performed by: OCCUPATIONAL THERAPIST

## 2021-03-31 NOTE — PROGRESS NOTES
SOAP Note Objective Information for 4/6/2021:    Pain Level (Scale 0-10):   3/9/2021 3/16/21 3/23/21 3/30/21 4/6/21   At Rest 4 4 3-4 3 2   With Use 4-9 8 7-8 5 3-4     Pain Description:  Date 3/9/2021 4/6/21   Location Right Radial Wrist Right radial wrist   Pain Quality Sore at rest, like a bad bruise Mild soreness   Frequency intermittent, constant or daily   Intermittent, constant   Pain is worst daytime, can be bothersome when trying to fall asleep daytime   Exacerbated by Force into ulnar deviation, opening jars,  Work tasks, overuse   Relieved by Rest, splint, CBD/Arnica Rest, splint, tape   Progression Gradually improving improving     Home Exercise Program:  OTC FABTSS  Custom Ott[place]ock  K-tape (DeQuervain's) and LEP  FM to 1st dorsal compartment  STM to FA flexors/extensors  Wrist AROM - ext/flex and RD/UD  Dequervain stretch  PROM wrist extension  Ice    Next Visit:  Laser  STM/FM  Check response wrist PROM   Future visits:strengthening    Please refer to the daily flowsheet for treatment today, total treatment time and time spent performing 1:1 timed codes.

## 2021-04-06 ENCOUNTER — THERAPY VISIT (OUTPATIENT)
Dept: OCCUPATIONAL THERAPY | Facility: CLINIC | Age: 41
End: 2021-04-06
Payer: COMMERCIAL

## 2021-04-06 DIAGNOSIS — M25.531 RIGHT WRIST PAIN: ICD-10-CM

## 2021-04-06 PROCEDURE — 97110 THERAPEUTIC EXERCISES: CPT | Mod: GO | Performed by: OCCUPATIONAL THERAPIST

## 2021-04-06 PROCEDURE — 97140 MANUAL THERAPY 1/> REGIONS: CPT | Mod: GO | Performed by: OCCUPATIONAL THERAPIST

## 2021-04-06 PROCEDURE — 97026 INFRARED THERAPY: CPT | Mod: GO | Performed by: OCCUPATIONAL THERAPIST

## 2021-04-07 NOTE — PROGRESS NOTES
Order(s) created erroneously. Erroneous order ID: [id number]   Order canceled by: BELKYS ZHU   Order cancel date/time: 04/07/2021 12:27 PM

## 2021-04-07 NOTE — PROGRESS NOTES
Order(s) created erroneously. Erroneous order ID: [id number]   Order canceled by: BELKYS ZHU   Order cancel date/time: 04/07/2021 12:26 PM

## 2021-04-14 ENCOUNTER — OFFICE VISIT (OUTPATIENT)
Dept: NURSING | Facility: CLINIC | Age: 41
End: 2021-04-14
Payer: COMMERCIAL

## 2021-04-14 PROCEDURE — 0001A PR COVID VAC PFIZER DIL RECON 30 MCG/0.3 ML IM: CPT

## 2021-04-14 PROCEDURE — 91300 PR COVID VAC PFIZER DIL RECON 30 MCG/0.3 ML IM: CPT

## 2021-04-16 NOTE — PROGRESS NOTES
SOAP Note Objective Information for 4/20/2021:    Pain Level (Scale 0-10):   3/9/2021 3/16/21 3/23/21 3/30/21 4/6/21 4/20/21   At Rest 4 4 3-4 3 2 0-2   With Use 4-9 8 7-8 5 3-4 0-2     Pain Description:  Date 3/9/2021 4/6/21   Location Right Radial Wrist Right radial wrist   Pain Quality Sore at rest, like a bad bruise Mild soreness   Frequency intermittent, constant or daily   Intermittent, constant   Pain is worst daytime, can be bothersome when trying to fall asleep daytime   Exacerbated by Force into ulnar deviation, opening jars,  Work tasks, overuse   Relieved by Rest, splint, CBD/Arnica Rest, splint, tape   Progression Gradually improving improving     ROM  Pain Report: - none  + mild    ++ moderate    +++ severe   Thumb 3/9/2021 3/9/2021 3/16/21   AROM (PROM) Left Right Right   MP -10/52 -15/38 0/45   IP 0/77 0/64 0/73   RABD 55 45 50   PABD 50 42 50     Wrist 3/9/2021 3/9/2021 3/16/21 3/23/21 3/30/21 4/20/21   AROM (PROM) Left Right Right Right Right Right   Extension 60 40 47 50 58 60 WNL   Flexion 80 65 71 80 WNL WNL   RD 25 20 20 25 WNL WNL   UD 40 15 25 30 33 40 WNL   UD with Th Flex 20- -10+ -5 10+ 15+ 18+     Home Exercise Program:  OTC FABTSS  Custom [place]  K-tape (DeQuervain's) and LEP  FM to 1st dorsal compartment  STM to FA flexors/extensors  Wrist AROM - ext/flex and RD/UD  Dequervain stretch  PROM wrist extension  Ice    Next Visit:  PN due  Laser  STM/FM  PROM   Discuss discharge planing    Please refer to the daily flowsheet for treatment today, total treatment time and time spent performing 1:1 timed codes.

## 2021-04-20 ENCOUNTER — THERAPY VISIT (OUTPATIENT)
Dept: OCCUPATIONAL THERAPY | Facility: CLINIC | Age: 41
End: 2021-04-20
Payer: COMMERCIAL

## 2021-04-20 DIAGNOSIS — M25.531 RIGHT WRIST PAIN: ICD-10-CM

## 2021-04-20 PROCEDURE — 97026 INFRARED THERAPY: CPT | Mod: GO | Performed by: OCCUPATIONAL THERAPIST

## 2021-04-20 PROCEDURE — 97110 THERAPEUTIC EXERCISES: CPT | Mod: GO | Performed by: OCCUPATIONAL THERAPIST

## 2021-04-20 PROCEDURE — 97140 MANUAL THERAPY 1/> REGIONS: CPT | Mod: GO | Performed by: OCCUPATIONAL THERAPIST

## 2021-05-05 ENCOUNTER — IMMUNIZATION (OUTPATIENT)
Dept: NURSING | Facility: CLINIC | Age: 41
End: 2021-05-05
Attending: INTERNAL MEDICINE
Payer: COMMERCIAL

## 2021-05-05 PROCEDURE — 91300 PR COVID VAC PFIZER DIL RECON 30 MCG/0.3 ML IM: CPT

## 2021-05-05 PROCEDURE — 0002A PR COVID VAC PFIZER DIL RECON 30 MCG/0.3 ML IM: CPT

## 2021-05-13 ENCOUNTER — TELEPHONE (OUTPATIENT)
Dept: ENDOCRINOLOGY | Facility: CLINIC | Age: 41
End: 2021-05-13

## 2021-05-13 NOTE — TELEPHONE ENCOUNTER
[facility] Call Center    Phone Message    May a detailed message be left on voicemail: yes     Reason for Call: Appointment Intake    Referring Provider Name: Anjana BARKER CNP  Diagnosis and/or Symptoms: [place]/ Pituitary adenoma    Pt was scheduled for next available video visit on 7/7/21 with Dr. Stevens.  Per guidelines, send encounter for review.      Pt is wondering if she should be seen in person or if virtual is ok.          Action Taken: Message routed to:  [facility] ([facility]): endo    Travel Screening: Not Applicable

## 2021-05-14 NOTE — TELEPHONE ENCOUNTER
Endocrine triage  Most recent pituitary imaging on the EMR Is 7/10/08235 3mm mass in sella.   There are no c[place] related labs on the EMR    The scheduled first available endocrine appointment timeframe is acceptable  Marija Allen MD

## 2021-06-05 ENCOUNTER — OFFICE VISIT (OUTPATIENT)
Dept: URGENT CARE | Facility: URGENT CARE | Age: 41
End: 2021-06-05
Payer: COMMERCIAL

## 2021-06-05 VITALS
SYSTOLIC BLOOD PRESSURE: 143 MMHG | DIASTOLIC BLOOD PRESSURE: 98 MMHG | BODY MASS INDEX: 36.9 KG/M2 | HEART RATE: 89 BPM | WEIGHT: 215 LBS | TEMPERATURE: 97.2 F | OXYGEN SATURATION: 98 %

## 2021-06-05 DIAGNOSIS — S61.012A LACERATION OF LEFT THUMB WITHOUT FOREIGN BODY WITHOUT DAMAGE TO NAIL, INITIAL ENCOUNTER: ICD-10-CM

## 2021-06-05 DIAGNOSIS — M62.838 MUSCLE SPASM: Primary | ICD-10-CM

## 2021-06-05 LAB
ANION GAP SERPL CALCULATED.3IONS-SCNC: 8 MMOL/L (ref 3–14)
BASOPHILS # BLD AUTO: 0.1 10E9/L (ref 0–0.2)
BASOPHILS NFR BLD AUTO: 0.7 %
BUN SERPL-MCNC: 8 MG/DL (ref 7–30)
CALCIUM SERPL-MCNC: 8.5 MG/DL (ref 8.5–10.1)
CHLORIDE SERPL-SCNC: 108 MMOL/L (ref 94–109)
CO2 SERPL-SCNC: 25 MMOL/L (ref 20–32)
CREAT SERPL-MCNC: 0.7 MG/DL (ref 0.52–1.04)
DIFFERENTIAL METHOD BLD: NORMAL
EOSINOPHIL # BLD AUTO: 0.1 10E9/L (ref 0–0.7)
EOSINOPHIL NFR BLD AUTO: 1.5 %
ERYTHROCYTE [DISTWIDTH] IN BLOOD BY AUTOMATED COUNT: 13.9 % (ref 10–15)
GFR SERPL CREATININE-BSD FRML MDRD: >90 ML/MIN/{1.73_M2}
GLUCOSE SERPL-MCNC: 105 MG/DL (ref 70–99)
HCT VFR BLD AUTO: 40 % (ref 35–47)
HGB BLD-MCNC: 13.9 G/DL (ref 11.7–15.7)
LYMPHOCYTES # BLD AUTO: 3.1 10E9/L (ref 0.8–5.3)
LYMPHOCYTES NFR BLD AUTO: 35.4 %
MCH RBC QN AUTO: 28.8 PG (ref 26.5–33)
MCHC RBC AUTO-ENTMCNC: 34.8 G/DL (ref 31.5–36.5)
MCV RBC AUTO: 83 FL (ref 78–100)
MONOCYTES # BLD AUTO: 0.7 10E9/L (ref 0–1.3)
MONOCYTES NFR BLD AUTO: 7.9 %
NEUTROPHILS # BLD AUTO: 4.8 10E9/L (ref 1.6–8.3)
NEUTROPHILS NFR BLD AUTO: 54.5 %
PLATELET # BLD AUTO: 357 10E9/L (ref 150–450)
POTASSIUM SERPL-SCNC: 3.9 MMOL/L (ref 3.4–5.3)
RBC # BLD AUTO: 4.82 10E12/L (ref 3.8–5.2)
SODIUM SERPL-SCNC: 141 MMOL/L (ref 133–144)
WBC # BLD AUTO: 8.7 10E9/L (ref 4–11)

## 2021-06-05 PROCEDURE — 86774 TETANUS ANTIBODY: CPT | Performed by: PHYSICIAN ASSISTANT

## 2021-06-05 PROCEDURE — 99213 OFFICE O/P EST LOW 20 MIN: CPT | Performed by: PHYSICIAN ASSISTANT

## 2021-06-05 PROCEDURE — 85025 COMPLETE CBC W/AUTO DIFF WBC: CPT | Performed by: PHYSICIAN ASSISTANT

## 2021-06-05 PROCEDURE — 86317 IMMUNOASSAY INFECTIOUS AGENT: CPT | Mod: 90 | Performed by: PHYSICIAN ASSISTANT

## 2021-06-05 PROCEDURE — 80048 BASIC METABOLIC PNL TOTAL CA: CPT | Performed by: PHYSICIAN ASSISTANT

## 2021-06-05 PROCEDURE — 36415 COLL VENOUS BLD VENIPUNCTURE: CPT | Performed by: PHYSICIAN ASSISTANT

## 2021-06-05 PROCEDURE — 99000 SPECIMEN HANDLING OFFICE-LAB: CPT | Performed by: PHYSICIAN ASSISTANT

## 2021-06-05 RX ORDER — DOXYCYCLINE HYCLATE 100 MG
100 TABLET ORAL 2 TIMES DAILY
Qty: 20 TABLET | Refills: 0 | Status: SHIPPED | OUTPATIENT
Start: 2021-06-05 | End: 2021-06-15

## 2021-06-05 ASSESSMENT — ENCOUNTER SYMPTOMS
FEVER: 0
MYALGIAS: 1
BACK PAIN: 1

## 2021-06-05 NOTE — PROGRESS NOTES
SUBJECTIVE:   Jackeline Yeboah is a 41 year old female presenting with a chief complaint of   Chief Complaint   Patient presents with     Urgent Care     Flank Pain     x 1 day muscle spasms      Laceration     x 2 weeks       She is an established patient of [facility].  Patient presents with complaints of spasms. She sustained a laceration to her left hand 2 weeks ago. She cut her finger on a metal bike part.  She had a tdap yesterday.  Other than yesterday, previous tetnus was 2004.  Patient started having spasms 4 days ago - jaw, back (ribs).  Patient states today she was having severe back spasms after trying to fix her hair.  Patient states she has been taking magnesium and electrolyte drinks.      Patient treatment:  No treatment by patient.     Review of Systems   Constitutional: Negative for fever.   Musculoskeletal: Positive for back pain and myalgias.   All other systems reviewed and are negative.      Past Medical History:   Diagnosis Date     Allergic rhinitis      Anxiety      Depressive disorder      Pituitary adenoma (H)      Uncomplicated asthma      Family History   Problem Relation Age of Onset     Hypertension Mother      Hyperlipidemia Mother      Diabetes Mother 40     Depression Mother      Asthma Mother      Obesity Mother 40     Hypertension Father      Hyperlipidemia Father      Asthma Father      Prostate Cancer Maternal Grandmother 50        lukemia     Prostate Cancer Paternal Grandmother 65        stomack Cancer     Asthma Brother      Current Outpatient Medications   Medication Sig Dispense Refill     ACETAMINOPHEN PO Take 325 mg by mouth as needed for pain or fever       albuterol (PROAIR HFA/PROVENTIL HFA/VENTOLIN HFA) 108 (90 Base) MCG/ACT inhaler Inhale 2 puffs into the lungs every 6 hours 1 Inhaler 2     doxycycline hyclate (VIBRA-TABS) 100 MG tablet Take 1 tablet (100 mg) by mouth 2 times daily for 10 days 20 tablet 0     Elastic Bandages & Supports (BAND-IT FOREARM BAND) MISC Use  on right forearm for the treatment of epicondylitis 1 each 0     EPINEPHrine (ADRENACLICK JR) 0.15 MG/0.15ML injection 2-pack Inject 0.15 mLs (0.15 mg) into the muscle as needed for anaphylaxis 2 each 3     IBUPROFEN PO Take 200 mg by mouth as needed for moderate pain or fever       loratadine (CLARITIN) 10 MG tablet Take 10 mg by mouth daily       melatonin 3 MG tablet Take 1 mg by mouth nightly as needed for sleep       Naproxen Sodium (ALEVE PO)        tiZANidine (ZANAFLEX) 4 MG tablet Take 1 tablet (4 mg) by mouth 3 times daily 30 tablet 0     Social History     Tobacco Use     Smoking status: Never Smoker     Smokeless tobacco: Never Used   Substance Use Topics     Alcohol use: No     Alcohol/week: 0.0 standard drinks       OBJECTIVE  BP (!) 143/98 (BP Location: Left arm, Patient Position: Sitting, Cuff Size: Adult Large)   Pulse 89   Temp 97.2  F (36.2  C) (Tympanic)   Wt 97.5 kg (215 lb)   SpO2 98%   Breastfeeding No   BMI 36.90 kg/m      Physical Exam  Vitals signs and nursing note reviewed.   Constitutional:       Appearance: Normal appearance. She is obese.   Eyes:      Extraocular Movements: Extraocular movements intact.      Conjunctiva/sclera: Conjunctivae normal.   Neck:      Musculoskeletal: Normal range of motion and neck supple.   Cardiovascular:      Rate and Rhythm: Normal rate and regular rhythm.      Pulses: Normal pulses.      Heart sounds: Normal heart sounds.   Pulmonary:      Effort: Pulmonary effort is normal.      Breath sounds: Normal breath sounds.   Musculoskeletal: Normal range of motion.      Comments: Diffuse back tenderness with palpation of lower back.     Skin:     General: Skin is warm and dry.      Comments: Wound on left hand thumb near nail and healing appropriately.   Neurological:      General: No focal deficit present.      Mental Status: She is alert.   Psychiatric:         Mood and Affect: Mood normal.         Behavior: Behavior normal.         Labs:  Results for  orders placed or performed in visit on 06/05/21 (from the past 24 hour(s))   Basic metabolic panel  (Ca, Cl, CO2, Creat, Gluc, K, Na, BUN)   Result Value Ref Range    Sodium 141 133 - 144 mmol/L    Potassium 3.9 3.4 - 5.3 mmol/L    Chloride 108 94 - 109 mmol/L    Carbon Dioxide 25 20 - 32 mmol/L    Anion Gap 8 3 - 14 mmol/L    Glucose 105 (H) 70 - 99 mg/dL    Urea Nitrogen 8 7 - 30 mg/dL    Creatinine 0.70 0.52 - 1.04 mg/dL    GFR Estimate >90 >60 mL/min/[1.73_m2]    GFR Estimate If Black >90 >60 mL/min/[1.73_m2]    Calcium 8.5 8.5 - 10.1 mg/dL   CBC with platelets and differential   Result Value Ref Range    WBC 8.7 4.0 - 11.0 10e9/L    RBC Count 4.82 3.8 - 5.2 10e12/L    Hemoglobin 13.9 11.7 - 15.7 g/dL    Hematocrit 40.0 35.0 - 47.0 %    MCV 83 78 - 100 fl    MCH 28.8 26.5 - 33.0 pg    MCHC 34.8 31.5 - 36.5 g/dL    RDW 13.9 10.0 - 15.0 %    Platelet Count 357 150 - 450 10e9/L    % Neutrophils 54.5 %    % Lymphocytes 35.4 %    % Monocytes 7.9 %    % Eosinophils 1.5 %    % Basophils 0.7 %    Absolute Neutrophil 4.8 1.6 - 8.3 10e9/L    Absolute Lymphocytes 3.1 0.8 - 5.3 10e9/L    Absolute Monocytes 0.7 0.0 - 1.3 10e9/L    Absolute Eosinophils 0.1 0.0 - 0.7 10e9/L    Absolute Basophils 0.1 0.0 - 0.2 10e9/L    Diff Method Automated Method        X-Ray was not done.    ASSESSMENT:      ICD-10-CM    1. Muscle spasm  M62.838 Basic metabolic panel  (Ca, Cl, CO2, Creat, Gluc, K, Na, BUN)     CBC with platelets and differential     Tetanus antibody     tiZANidine (ZANAFLEX) 4 MG tablet     CANCELED: CBC with platelets and differential     CANCELED: Tetanus antibody   2. Laceration of left thumb without foreign body without damage to nail, initial encounter  S61.012A CBC with platelets and differential     tiZANidine (ZANAFLEX) 4 MG tablet     doxycycline hyclate (VIBRA-TABS) 100 MG tablet     CANCELED: CBC with platelets and differential     CANCELED: Tetanus antibody        Medical Decision Making:    Differential  Diagnosis:  Electrolyte imbalance, tetnus, muscle spasms    Serious Comorbid Conditions:  Adult:  as above    PLAN:    Rx for zanaflex and doxycycline.  Patient education. Discussed reasons to seek immediate medical attention.       Followup:    If not improving or if condition worsens, follow up with your Primary Care Provider, If not improving or if conditions worsens over the next 12-24 hours, go to the Emergency Department    Patient Instructions     Patient Education     Back Spasm (No Trauma)    Spasm of the back muscles can occur after a sudden forceful twisting or bending such as in a car accident. A spasm can also happen after a simple awkward movement, or after lifting something heavy with poor body positioning. In any case, muscle spasm adds to the pain. Sleeping in an awkward position or on a poor quality mattress can also cause this. Some people respond to emotional stress by tensing the muscles of their back.  Pain that continues may need further assessment or other types of treatment such as physical therapy.  You don't always need X-rays for the first assessment of back pain, unless you had a physical injury such as from a car accident or fall. If your pain continues and doesn't respond to medical treatment, X-rays and other tests may then be done.   Home care    As soon as possible, start sitting or walking again. This will help prevent problems from a long bed rest. These problems include muscle weakness, worsening back stiffness and pain, and blood clots in the legs.    When in bed, try to find a position of comfort. A firm mattress is best. Try lying flat on your back with pillows under your knees. You can also try lying on your side with your knees bent up toward your chest and a pillow between your knees.    Don't sit for long periods. Also limit car rides and travel. This puts more stress on the lower back than standing or walking.     During the first 24 to 72 hours after an injury or  flare-up, put an ice pack on the painful area for 20 minutes, then remove it for 20 minutes. Do this over a period of 60 to 90 minutes, or several times a day. This will reduce swelling and pain. Always wrap ice packs in a thin towel.    You can start with ice, then switch to heat. Heat from a hot shower, hot bath, or heating pad reduces pain and works well for muscle spasms. Put heat on the painful area for 20 minutes, then remove it for 20 minutes. Do this over a period of 60 to 90 minutes, or several times a day. Don't sleep on a heating pad. It can burn or damage skin.    Alternate using ice and heat.    Be aware of safe lifting methods. don't lift anything over 15 pounds until all the pain is gone.  Gentle stretching will help your back heal faster. Do this simple routine 2 to 3 times a day until your back is feeling better.    Lie on your back with your knees bent and both feet on the ground.    Slowly raise your left knee to your chest as you flatten your lower back against the floor. Hold for 20 to 30 seconds.    Relax and repeat the exercise with your right knee.    Do 2 to 3 of these exercises for each leg.    Repeat, hugging both knees to your chest at the same time.    Don't bounce, but use a gentle pull.  Medicines  Talk with your doctor before using medicine, especially if you have other medical problems or are taking other medicines.  You may use over-the-counter medicines such as acetaminophen, ibuprofen, or naprosyn to control pain, unless your healthcare provider prescribed another pain medicine. Talk with your healthcare provider if you have a chronic condition such as diabetes, liver or kidney disease, stomach ulcer, or digestive bleeding, or are taking blood thinners.  Be careful if you are given prescription pain medicine, opioids, or medicine for muscle spasm. They can cause drowsiness, and affect your coordination, reflexes, and judgment. Don't drive or operate heavy machinery when taking  these medicines. Take pain medicine only as prescribed by your healthcare provider.  Follow-up care  Follow up with your doctor, or as advised. You may need physical therapy or more tests.  If X-rays were taken, they may be reviewed by a radiologist. You will be told of any new findings that may affect your care.  Call   Call if any of these occur:    Trouble breathing    Confusion    Drowsiness or trouble awakening    Fainting or loss of consciousness    Rapid or very slow heart rate    Loss of bowel or bladder control  When to seek medical advice  Call your healthcare provider right away if any of these occur:    Pain becomes worse or spreads to your legs    Weakness or numbness in one or both legs    Numbness in the groin or genital area    Fever of 100.4 F (38 C) or higher , or as directed by your healthcare provider    Chills    Burning or pain when passing urine  [employer] last reviewed this educational content on 11/1/2018 2000-2021 [employer]. All rights reserved. This information is not intended as a substitute for professional medical care. Always follow your healthcare professional's instructions.

## 2021-06-05 NOTE — PATIENT INSTRUCTIONS
Patient Education     Back Spasm (No Trauma)    Spasm of the back muscles can occur after a sudden forceful twisting or bending such as in a car accident. A spasm can also happen after a simple awkward movement, or after lifting something heavy with poor body positioning. In any case, muscle spasm adds to the pain. Sleeping in an awkward position or on a poor quality mattress can also cause this. Some people respond to emotional stress by tensing the muscles of their back.  Pain that continues may need further assessment or other types of treatment such as physical therapy.  You don't always need X-rays for the first assessment of back pain, unless you had a physical injury such as from a car accident or fall. If your pain continues and doesn't respond to medical treatment, X-rays and other tests may then be done.   Home care    As soon as possible, start sitting or walking again. This will help prevent problems from a long bed rest. These problems include muscle weakness, worsening back stiffness and pain, and blood clots in the legs.    When in bed, try to find a position of comfort. A firm mattress is best. Try lying flat on your back with pillows under your knees. You can also try lying on your side with your knees bent up toward your chest and a pillow between your knees.    Don't sit for long periods. Also limit car rides and travel. This puts more stress on the lower back than standing or walking.     During the first 24 to 72 hours after an injury or flare-up, put an ice pack on the painful area for 20 minutes, then remove it for 20 minutes. Do this over a period of 60 to 90 minutes, or several times a day. This will reduce swelling and pain. Always wrap ice packs in a thin towel.    You can start with ice, then switch to heat. Heat from a hot shower, hot bath, or heating pad reduces pain and works well for muscle spasms. Put heat on the painful area for 20 minutes, then remove it for 20 minutes. Do this  over a period of 60 to 90 minutes, or several times a day. Don't sleep on a heating pad. It can burn or damage skin.    Alternate using ice and heat.    Be aware of safe lifting methods. don't lift anything over 15 pounds until all the pain is gone.  Gentle stretching will help your back heal faster. Do this simple routine 2 to 3 times a day until your back is feeling better.    Lie on your back with your knees bent and both feet on the ground.    Slowly raise your left knee to your chest as you flatten your lower back against the floor. Hold for 20 to 30 seconds.    Relax and repeat the exercise with your right knee.    Do 2 to 3 of these exercises for each leg.    Repeat, hugging both knees to your chest at the same time.    Don't bounce, but use a gentle pull.  Medicines  Talk with your doctor before using medicine, especially if you have other medical problems or are taking other medicines.  You may use over-the-counter medicines such as acetaminophen, ibuprofen, or naprosyn to control pain, unless your healthcare provider prescribed another pain medicine. Talk with your healthcare provider if you have a chronic condition such as diabetes, liver or kidney disease, stomach ulcer, or digestive bleeding, or are taking blood thinners.  Be careful if you are given prescription pain medicine, opioids, or medicine for muscle spasm. They can cause drowsiness, and affect your coordination, reflexes, and judgment. Don't drive or operate heavy machinery when taking these medicines. Take pain medicine only as prescribed by your healthcare provider.  Follow-up care  Follow up with your doctor, or as advised. You may need physical therapy or more tests.  If X-rays were taken, they may be reviewed by a radiologist. You will be told of any new findings that may affect your care.  Call   Call if any of these occur:    Trouble breathing    Confusion    Drowsiness or trouble awakening    Fainting or loss of consciousness    Rapid  or very slow heart rate    Loss of bowel or bladder control  When to seek medical advice  Call your healthcare provider right away if any of these occur:    Pain becomes worse or spreads to your legs    Weakness or numbness in one or both legs    Numbness in the groin or genital area    Fever of 100.4 F (38 C) or higher , or as directed by your healthcare provider    Chills    Burning or pain when passing urine  [employer] last reviewed this educational content on 11/1/2018 2000-2021 [employer]. All rights reserved. This information is not intended as a substitute for professional medical care. Always follow your healthcare professional's instructions.

## 2021-06-06 LAB — C TETANI IGG SER IA-ACNC: NORMAL IU/ML

## 2021-06-07 ENCOUNTER — OFFICE VISIT (OUTPATIENT)
Dept: FAMILY MEDICINE | Facility: CLINIC | Age: 41
End: 2021-06-07

## 2021-06-07 VITALS
BODY MASS INDEX: 37.27 KG/M2 | SYSTOLIC BLOOD PRESSURE: 139 MMHG | WEIGHT: 217.1 LBS | TEMPERATURE: 98.9 F | HEART RATE: 106 BPM | DIASTOLIC BLOOD PRESSURE: 83 MMHG | OXYGEN SATURATION: 98 %

## 2021-06-07 DIAGNOSIS — S61.239D: Primary | ICD-10-CM

## 2021-06-07 DIAGNOSIS — M62.838 MUSCLE SPASM: ICD-10-CM

## 2021-06-07 PROCEDURE — 99214 OFFICE O/P EST MOD 30 MIN: CPT | Performed by: FAMILY MEDICINE

## 2021-06-07 RX ORDER — FLUCONAZOLE 150 MG/1
TABLET ORAL
COMMUNITY
Start: 2021-01-14 | End: 2021-06-07

## 2021-06-07 RX ORDER — DOXYCYCLINE 100 MG/1
CAPSULE ORAL
COMMUNITY
Start: 2021-06-05 | End: 2021-06-07

## 2021-06-07 NOTE — PROGRESS NOTES
Karen Viveros is a 41 year old patient who presents to clinic for evaluation.  She cut her thumb on a bike ring about 2 weeks ago.  It bled briskly but is healing.  She has had intermittent muscle spasms and tightness.  She received a tetanus booster on 6/4.  On 6/5 was seen in urgent care with reassuring exam and CBC.  Tetanus antibodies are pending.  She was started on doxycyline.  Symptoms seem better but has been having some left side pain.  Doxy is causing GI side effects but otherwise doing better.  No fever, chills, trismus, dysphagia or rigidity.       Review of Systems   Constitutional, HEENT, cardiovascular, pulmonary, GI, , musculoskeletal, neuro, skin, endocrine and psych systems are negative, except as otherwise noted.      Objective    /83   Pulse 106   Temp 98.9  F (37.2  C)   Wt 98.5 kg (217 lb 1.6 oz)   SpO2 98%   BMI 37.27 kg/m      General: Well appearing, NAD  HEENT: oropharynx clear  Neck: supple, full ROM, nontender  Heart: RRR, no murmur  Chest: Lungs CTAB  Abd: soft, not rigid.  Mild tenderness overlying left lateral abdomen without rebound.  Skin: Clear  MSK: no obvious spasm appreciated.  Full ROM of all extremities  Psych: normal mood and affect        Assessment & Plan     Penetrating wound of finger, subsequent encounter  - Creatine Kinase Total Serum ([facility])  - C-Reactive Protein  Quant ([facility])    Muscle spasm  Uncertain cause of symptoms.  Tetanus is unlikely but ongoing vigilance is warranted.  Am reassured by her clinical appearance.  Will check CK and CRP.  Close monitoring.  If any emergent symptoms develop advised immediate eval in hospital.  Patient agrees with plan.  - Creatine Kinase Total Serum ([facility])  - C-Reactive Protein  Quant ([facility])    García Leon MD  [facility]

## 2021-06-08 LAB
CK SERPL-CCNC: 176 U/L (ref 32–182)
CRP SERPL-MCNC: 8 MG/L (ref 0–10)
RESULT: NORMAL
SEND OUTS MISC TEST CODE: NORMAL
SEND OUTS MISC TEST SPECIMEN: NORMAL
TEST NAME: NORMAL

## 2021-07-07 ENCOUNTER — VIRTUAL VISIT (OUTPATIENT)
Dept: ENDOCRINOLOGY | Facility: CLINIC | Age: 41
End: 2021-07-07
Attending: NURSE PRACTITIONER
Payer: COMMERCIAL

## 2021-07-07 DIAGNOSIS — E22.1 HYPERPROLACTINEMIA (H): ICD-10-CM

## 2021-07-07 DIAGNOSIS — M25.50 ARTHRALGIA, UNSPECIFIED JOINT: ICD-10-CM

## 2021-07-07 DIAGNOSIS — N92.6 IRREGULAR MENSES: ICD-10-CM

## 2021-07-07 DIAGNOSIS — L68.0 HIRSUTISM: ICD-10-CM

## 2021-07-07 DIAGNOSIS — E66.89 OTHER OBESITY: ICD-10-CM

## 2021-07-07 DIAGNOSIS — R73.09 OTHER ABNORMAL GLUCOSE: ICD-10-CM

## 2021-07-07 DIAGNOSIS — D49.7 PITUITARY TUMOR: Primary | ICD-10-CM

## 2021-07-07 PROCEDURE — 99205 OFFICE O/P NEW HI 60 MIN: CPT | Mod: 95 | Performed by: INTERNAL MEDICINE

## 2021-07-07 NOTE — LETTER
7/7/2021         RE: Jackeline Yeboah  [address]  [facility] [address]        Dear Colleague,    Thank you for referring your patient, Jackeline Yeboah, to the [facility]. Please see a copy of my visit note below.    Patient is being evaluated via a billable video visit.      How would you like to obtain your AVS? Reviewed verbally  If the video visit is dropped, the invitation should be resent by cell phone  Will anyone else be joining your video visit? no      Video Start Time:  2:10 pm    Video-Visit Details    Type of service:  Video Visit    Video End Time: 2:35 pm    Originating Location (pt. Location): home    Distant Location (provider location):  [facility]/[place]    Platform used for Video Visit:  "[employer]      Name: Jackeline Yeboah is a 41 year old woman, seen at the request of Anjana Peng CNP for evaluation of     Chief Complaint   Patient presents with     Endocrine Problem     Pituitary adenoma        HPI:  Recent issues:  Here for evaluation of pituitary nodule vs cyst  Reviewed medical history from patient and Epic chart record        2017. History of mild hyperprolactinemia  Details of initial symptoms, testing unclear  Had seen Dr. 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 Endocrinology consult with Dr. Francisco Lyons/[facility] office  Details of endocrinology evaluation and progress notes not available here  2/4/17 labs included normal IGF1 242, cortisol 10.3, DHEAS 207, FT4 1.14, TSH 2.8 and the GH <0.1  Patient recalls discussion of med but none used, has not used medication to lower prolactin levels    7/10/19 Head MRI with/without contrast:   3 mm T2 hyperintense nonenhancing lesion in sella turcica, no change in size    Previous [facility] labs  include:        Recent [facility] labs include:  Lab Results   Component Value Date    TESTOSTTOTAL 31 01/16/2017    TSH 2.80 02/03/2017    T4 1.24 02/25/2021    SG 1.015 07/24/2014      Recent symptoms:   Easy skin bruising   Pain and swelling at all joints   Increased hair growth- arms, legs, face, chest   Scalp hair thinning   Worsening facial acne   Migraine headaches   Breast tenderness but no nipple discharge   Heart racing   Photosensitivity   Irreg menses 35 weeks frequency  Other chronic illnesses include:   Depression:   Takes albuterol med, followed by PCP   Asthma:    Followed by PCP   YONATAN:   Followed by PCP        Lives in [place], [place]  Seerichardson Peng CNP for general medicine evaluations.    PMH/PSH:  Past Medical History:   Diagnosis Date     Allergic rhinitis      Anxiety      Depressive disorder      YONATAN (obstructive sleep apnea)      Pituitary adenoma (H)      Uncomplicated asthma      Past Surgical History:   Procedure Laterality Date     C LAP OVARIAN CYSTOTOMY  2007       Family Hx:  Family History   Problem Relation Age of Onset     Hypertension Mother      Hyperlipidemia Mother      Diabetes Mother 40     Depression Mother      Asthma Mother      Obesity Mother 40     Hypertension Father      Hyperlipidemia Father      Asthma Father      Prostate Cancer Maternal Grandmother 50        lukemia     Prostate Cancer Paternal Grandmother 65        stomack Cancer     Asthma Brother          Social Hx:  Social History     Socioeconomic History     Marital status: Single     Spouse name: Not on file     Number of children: Not on file     Years of education: Not on file     Highest education level: Not on file   Occupational History     Not on file   Social Needs     Financial resource strain: Not on file     Food insecurity     Worry: Not on file     Inability: Not on file     Transportation needs     Medical: Not on file     Non-medical: Not on file   Tobacco Use     Smoking status: Never Smoker      Smokeless tobacco: Never Used   Substance and Sexual Activity     Alcohol use: No     Alcohol/week: 0.0 standard drinks     Drug use: No     Sexual activity: Yes   Lifestyle     Physical activity     Days per week: Not on file     Minutes per session: Not on file     Stress: Not on file   Relationships     Social connections     Talks on phone: Not on file     Gets together: Not on file     Attends [employer] service: Not on file     Active member of club or organization: Not on file     Attends meetings of clubs or organizations: Not on file     Relationship status: Not on file     Intimate partner violence     Fear of current or ex partner: Not on file     Emotionally abused: Not on file     Physically abused: Not on file     Forced sexual activity: Not on file   Other Topics Concern     Parent/sibling w/ CABG, MI or angioplasty before 65F 55M? Not Asked   Social History Narrative     Not on file          MEDICATIONS:  has a current medication list which includes the following prescription(s): albuterol, loratadine, melatonin, tizanidine, acetaminophen, band-it forearm band, epinephrine, ibuprofen, and naproxen sodium.    ROS:     ROS: 10 point ROS neg other than the symptoms noted above in the HPI.    GENERAL: fatigue, wt gain; denies fevers, chills, night sweats.    HEENT: no dysphagia, odonophagia, diplopia, neck pain  THYROID:  no apparent hyper or hypothyroid symptoms  BREASTS:  tenderness, no nipple discharge  CV: heart racing; no chest pain, pressure, palpitations  LUNGS: no SOB, VILLARREAL, cough, wheezing   ABDOMEN: no diarrhea, constipation, abdominal pain  EXTREMITIES: no rashes, ulcers, edema  NEUROLOGY: headaches, denies changes in vision, tingling, extremitiy numbness   MSK: multiple joint aches and pains; denies muscle weakness  SKIN: scalp hair thinning, hirsutism and increased body hair; no rashes or lesions  : irregular menses every 21-35 days  PSYCH:  stable mood, no significant anxiety or  depression  ENDOCRINE: no heat or cold intolerance    Physical Exam (visual exam)  VS:  no vital signs taken for video visit  CONSTITUTIONAL: healthy, alert and NAD, well dressed, answering questions appropriately  ENT: no nose swelling or nasal discharge, mouth redness or gum changes.  EYES: eyes grossly normal to inspection, conjunctivae and sclerae normal, no exophthalmos or proptosis  THYROID:  no apparent nodules or goiter  LUNGS: no audible wheeze, cough or visible cyanosis, no visible retractions or increased work of breathing  ABDOMEN: abdomen not evaluated  EXTREMITIES: no hand tremors, limited exam  NEUROLOGY: CN grossly intact, mentation intact and speech normal   SKIN:  no apparent skin lesions, rash, or edema with visualized skin appearance  PSYCH: mentation appears normal, affect normal/bright, judgement and insight intact,   normal speech and appearance well groomed      LABS:    All pertinent notes, labs, and images personally reviewed by me.     A/P:  Encounter Diagnoses   Name Primary?     Pituitary tumor Yes     Hyperprolactinemia (H)      Irregular menses      Hirsutism      Arthralgia, unspecified joint      Other abnormal glucose       Other obesity         Comments:   Reviewed health history, pituitary, and possible endocrine issues  Small pituitary nodule may represent microprolactinoma  Needs additional endocrine testing to address multiple symptoms.  DDX includes microprolactinoma, PCOS, Cushings Syndrome    Plan:  Discussed general issues with pituitary evaluation and management  Reviewed pituitary gland anatomy and hormone physiology  Discussed lab tests used to assess patient pituitary-axis hormone levels  We discussed the previous pituitary MRI imaging procedure    Recommend:  Advised further hormone testing for pituitary, thyroid, adrenal, gonad evaluation   Discussed blood and 24-hr urine testing, discussed   Reviewed nearby [facility] lab   Lab orders placed  Monitor for symptom  changes  Keep focus on diet, exercise, weight management  Consider repeat head MRI pituitary scan  Discussed treatment option with bromocryptine vs cabergoline if persistent hyperprolactinemia  Also consider trial of metformin if workup consistent with PCOS  Will review labs, imaging information at follow-up evaluation soon    Addressed patient questions today    There are no Patient Instructions on file for this visit.    Future labs ordered today:   Orders Placed This Encounter   Procedures     Glucose     Hemoglobin A1c     TSH with free T4 reflex     Cortisol     Testosterone total     Cortisol free urine     Vitamin D Deficiency     Prolactin     Radiology/Consults ordered today: None    Total time spent in with the patient evaluation:  25 min  Additional time spent reviewing pertinent lab tests and chart notes, and documentation:  35 min    Follow-up:  7/23/21 at 3pm, [facility]    CRUZITO Stevens MD, MS  Endocrinology  [facility]    CC: Anjana Peng           Again, thank you for allowing me to participate in the care of your patient.        Sincerely,        Reji Stevens MD

## 2021-07-07 NOTE — PROGRESS NOTES
Patient is being evaluated via a billable video visit.      How would you like to obtain your AVS? Reviewed verbally  If the video visit is dropped, the invitation should be resent by cell phone  Will anyone else be joining your video visit? no      Video Start Time:  2:10 pm    Video-Visit Details    Type of service:  Video Visit    Video End Time: 2:35 pm    Originating Location (pt. Location): home    Distant Location (provider location):  [facility]/[place]    Platform used for Video Visit:  [employer]      Name: Jackeline Yeboah is a 41 year old woman, seen at the request of Anjana Peng CNP for evaluation of     Chief Complaint   Patient presents with     Endocrine Problem     Pituitary adenoma        HPI:  Recent issues:  Here for evaluation of pituitary nodule vs cyst  Reviewed medical history from patient and Epic chart record        2017. History of mild hyperprolactinemia  Details of initial symptoms, testing unclear  Had seen Dr. 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 Endocrinology consult with Dr. Francisco Lyons/[facility] office  Details of endocrinology evaluation and progress notes not available here  2/4/17 labs included normal IGF1 242, cortisol 10.3, DHEAS 207, FT4 1.14, TSH 2.8 and the GH <0.1  Patient recalls discussion of med but none used, has not used medication to lower prolactin levels    7/10/19 Head MRI with/without contrast:   3 mm T2 hyperintense nonenhancing lesion in sella turcica, no change in size    Previous [facility] labs include:        Recent [facility] labs include:  Lab Results   Component Value Date    TESTOSTTOTAL 31 01/16/2017    TSH 2.80 02/03/2017    T4 1.24 02/25/2021    SG 1.015 07/24/2014      Recent symptoms:   Easy skin bruising   Pain and swelling at all joints   Increased  hair growth- arms, legs, face, chest   Scalp hair thinning   Worsening facial acne   Migraine headaches   Breast tenderness but no nipple discharge   Heart racing   Photosensitivity   Irreg menses 35 weeks frequency  Other chronic illnesses include:   Depression:   Takes albuterol med, followed by PCP   Asthma:    Followed by PCP   YONATAN:   Followed by PCP        Lives in [place], [place]  Seerichardson Peng CNP for general medicine evaluations.    PMH/PSH:  Past Medical History:   Diagnosis Date     Allergic rhinitis      Anxiety      Depressive disorder      YONATAN (obstructive sleep apnea)      Pituitary adenoma (H)      Uncomplicated asthma      Past Surgical History:   Procedure Laterality Date     C LAP OVARIAN CYSTOTOMY  2007       Family Hx:  Family History   Problem Relation Age of Onset     Hypertension Mother      Hyperlipidemia Mother      Diabetes Mother 40     Depression Mother      Asthma Mother      Obesity Mother 40     Hypertension Father      Hyperlipidemia Father      Asthma Father      Prostate Cancer Maternal Grandmother 50        lukemia     Prostate Cancer Paternal Grandmother 65        stomack Cancer     Asthma Brother          Social Hx:  Social History     Socioeconomic History     Marital status: Single     Spouse name: Not on file     Number of children: Not on file     Years of education: Not on file     Highest education level: Not on file   Occupational History     Not on file   Social Needs     Financial resource strain: Not on file     Food insecurity     Worry: Not on file     Inability: Not on file     Transportation needs     Medical: Not on file     Non-medical: Not on file   Tobacco Use     Smoking status: Never Smoker     Smokeless tobacco: Never Used   Substance and Sexual Activity     Alcohol use: No     Alcohol/week: 0.0 standard drinks     Drug use: No     Sexual activity: Yes   Lifestyle     Physical activity     Days per week: Not on file     Minutes per session: Not on file      Stress: Not on file   Relationships     Social connections     Talks on phone: Not on file     Gets together: Not on file     Attends Hindu service: Not on file     Active member of club or organization: Not on file     Attends meetings of clubs or organizations: Not on file     Relationship status: Not on file     Intimate partner violence     Fear of current or ex partner: Not on file     Emotionally abused: Not on file     Physically abused: Not on file     Forced sexual activity: Not on file   Other Topics Concern     Parent/sibling w/ CABG, MI or angioplasty before 65F 55M? Not Asked   Social History Narrative     Not on file          MEDICATIONS:  has a current medication list which includes the following prescription(s): albuterol, loratadine, melatonin, tizanidine, acetaminophen, band-it forearm band, epinephrine, ibuprofen, and naproxen sodium.    ROS:     ROS: 10 point ROS neg other than the symptoms noted above in the HPI.    GENERAL: fatigue, wt gain; denies fevers, chills, night sweats.    HEENT: no dysphagia, odonophagia, diplopia, neck pain  THYROID:  no apparent hyper or hypothyroid symptoms  BREASTS:  tenderness, no nipple discharge  CV: heart racing; no chest pain, pressure, palpitations  LUNGS: no SOB, VILLARREAL, cough, wheezing   ABDOMEN: no diarrhea, constipation, abdominal pain  EXTREMITIES: no rashes, ulcers, edema  NEUROLOGY: headaches, denies changes in vision, tingling, extremitiy numbness   MSK: multiple joint aches and pains; denies muscle weakness  SKIN: scalp hair thinning, hirsutism and increased body hair; no rashes or lesions  : irregular menses every 21-35 days  PSYCH:  stable mood, no significant anxiety or depression  ENDOCRINE: no heat or cold intolerance    Physical Exam (visual exam)  VS:  no vital signs taken for video visit  CONSTITUTIONAL: healthy, alert and NAD, well dressed, answering questions appropriately  ENT: no nose swelling or nasal discharge, mouth redness or  gum changes.  EYES: eyes grossly normal to inspection, conjunctivae and sclerae normal, no exophthalmos or proptosis  THYROID:  no apparent nodules or goiter  LUNGS: no audible wheeze, cough or visible cyanosis, no visible retractions or increased work of breathing  ABDOMEN: abdomen not evaluated  EXTREMITIES: no hand tremors, limited exam  NEUROLOGY: CN grossly intact, mentation intact and speech normal   SKIN:  no apparent skin lesions, rash, or edema with visualized skin appearance  PSYCH: mentation appears normal, affect normal/bright, judgement and insight intact,   normal speech and appearance well groomed      LABS:    All pertinent notes, labs, and images personally reviewed by me.     A/P:  Encounter Diagnoses   Name Primary?     Pituitary tumor Yes     Hyperprolactinemia (H)      Irregular menses      Hirsutism      Arthralgia, unspecified joint      Other abnormal glucose       Other obesity         Comments:   Reviewed health history, pituitary, and possible endocrine issues  Small pituitary nodule may represent microprolactinoma  Needs additional endocrine testing to address multiple symptoms.  DDX includes microprolactinoma, PCOS, Cushings Syndrome    Plan:  Discussed general issues with pituitary evaluation and management  Reviewed pituitary gland anatomy and hormone physiology  Discussed lab tests used to assess patient pituitary-axis hormone levels  We discussed the previous pituitary MRI imaging procedure    Recommend:  Advised further hormone testing for pituitary, thyroid, adrenal, gonad evaluation   Discussed blood and 24-hr urine testing, discussed   Reviewed nearby [facility] clinic lab   Lab orders placed  Monitor for symptom changes  Keep focus on diet, exercise, weight management  Consider repeat head MRI pituitary scan  Discussed treatment option with bromocryptine vs cabergoline if persistent hyperprolactinemia  Also consider trial of metformin if workup consistent with PCOS  Will review  labs, imaging information at follow-up evaluation soon    Addressed patient questions today    There are no Patient Instructions on file for this visit.    Future labs ordered today:   Orders Placed This Encounter   Procedures     Glucose     Hemoglobin A1c     TSH with free T4 reflex     Cortisol     Testosterone total     Cortisol free urine     Vitamin D Deficiency     Prolactin     Radiology/Consults ordered today: None    Total time spent in with the patient evaluation:  25 min  Additional time spent reviewing pertinent lab tests and chart notes, and documentation:  35 min    Follow-up:  7/23/21 at 3pm, Saravanan Stevens MD, MS  Endocrinology  [facility]    CC: Anjana Peng

## 2021-07-09 DIAGNOSIS — L68.0 HIRSUTISM: Primary | ICD-10-CM

## 2021-07-14 ENCOUNTER — LAB (OUTPATIENT)
Dept: LAB | Facility: CLINIC | Age: 41
End: 2021-07-14
Payer: COMMERCIAL

## 2021-07-14 DIAGNOSIS — D49.7 PITUITARY TUMOR: ICD-10-CM

## 2021-07-14 DIAGNOSIS — E66.89 OTHER OBESITY: ICD-10-CM

## 2021-07-14 DIAGNOSIS — R73.09 OTHER ABNORMAL GLUCOSE: ICD-10-CM

## 2021-07-14 DIAGNOSIS — E22.1 HYPERPROLACTINEMIA (H): ICD-10-CM

## 2021-07-14 DIAGNOSIS — N92.6 IRREGULAR MENSES: ICD-10-CM

## 2021-07-14 DIAGNOSIS — L68.0 HIRSUTISM: ICD-10-CM

## 2021-07-14 DIAGNOSIS — M25.50 ARTHRALGIA, UNSPECIFIED JOINT: ICD-10-CM

## 2021-07-14 LAB — HBA1C MFR BLD: 5.5 % (ref 0–5.6)

## 2021-07-14 PROCEDURE — 84443 ASSAY THYROID STIM HORMONE: CPT

## 2021-07-14 PROCEDURE — 82306 VITAMIN D 25 HYDROXY: CPT

## 2021-07-14 PROCEDURE — 84146 ASSAY OF PROLACTIN: CPT

## 2021-07-14 PROCEDURE — 82947 ASSAY GLUCOSE BLOOD QUANT: CPT

## 2021-07-14 PROCEDURE — 84403 ASSAY OF TOTAL TESTOSTERONE: CPT

## 2021-07-14 PROCEDURE — 82533 TOTAL CORTISOL: CPT

## 2021-07-14 PROCEDURE — 83036 HEMOGLOBIN GLYCOSYLATED A1C: CPT

## 2021-07-14 PROCEDURE — 84439 ASSAY OF FREE THYROXINE: CPT

## 2021-07-14 PROCEDURE — 36415 COLL VENOUS BLD VENIPUNCTURE: CPT

## 2021-07-15 LAB
CORTIS SERPL-MCNC: 10.5 UG/DL (ref 4–22)
DEPRECATED CALCIDIOL+CALCIFEROL SERPL-MC: 22 UG/L (ref 20–75)
FASTING STATUS PATIENT QL REPORTED: NO
GLUCOSE BLD-MCNC: 109 MG/DL (ref 70–99)
PROLACTIN SERPL-MCNC: 22 UG/L (ref 3–27)
T4 FREE SERPL-MCNC: 0.89 NG/DL (ref 0.76–1.46)
TSH SERPL DL<=0.005 MIU/L-ACNC: 5.4 MU/L (ref 0.4–4)

## 2021-07-16 ENCOUNTER — LAB (OUTPATIENT)
Dept: LAB | Facility: CLINIC | Age: 41
End: 2021-07-16
Payer: COMMERCIAL

## 2021-07-16 DIAGNOSIS — N92.6 IRREGULAR MENSES: ICD-10-CM

## 2021-07-16 DIAGNOSIS — D49.7 PITUITARY TUMOR: ICD-10-CM

## 2021-07-16 LAB — TESTOST SERPL-MCNC: 16 NG/DL (ref 8–60)

## 2021-07-16 PROCEDURE — 99000 SPECIMEN HANDLING OFFICE-LAB: CPT

## 2021-07-16 PROCEDURE — 82530 CORTISOL FREE: CPT | Mod: 90

## 2021-07-20 PROBLEM — M25.531 RIGHT WRIST PAIN: Status: RESOLVED | Noted: 2021-03-09 | Resolved: 2021-07-20

## 2021-07-23 ENCOUNTER — VIRTUAL VISIT (OUTPATIENT)
Dept: ENDOCRINOLOGY | Facility: CLINIC | Age: 41
End: 2021-07-23
Payer: COMMERCIAL

## 2021-07-23 DIAGNOSIS — E66.09 CLASS 2 OBESITY DUE TO EXCESS CALORIES WITH BODY MASS INDEX (BMI) OF 36.0 TO 36.9 IN ADULT, UNSPECIFIED WHETHER SERIOUS COMORBIDITY PRESENT: ICD-10-CM

## 2021-07-23 DIAGNOSIS — E66.812 CLASS 2 OBESITY DUE TO EXCESS CALORIES WITH BODY MASS INDEX (BMI) OF 36.0 TO 36.9 IN ADULT, UNSPECIFIED WHETHER SERIOUS COMORBIDITY PRESENT: ICD-10-CM

## 2021-07-23 DIAGNOSIS — Z86.39 HISTORY OF HYPERPROLACTINEMIA: ICD-10-CM

## 2021-07-23 DIAGNOSIS — E28.2 PCOS (POLYCYSTIC OVARIAN SYNDROME): Primary | ICD-10-CM

## 2021-07-23 DIAGNOSIS — E03.8 SUBCLINICAL HYPOTHYROIDISM: ICD-10-CM

## 2021-07-23 DIAGNOSIS — R73.03 PREDIABETES: ICD-10-CM

## 2021-07-23 DIAGNOSIS — E55.9 VITAMIN D INSUFFICIENCY: ICD-10-CM

## 2021-07-23 LAB
ANNOTATION COMMENT IMP: ABNORMAL
CORTIS F 24H UR HPLC-MCNC: 7.26 UG/L
CORTIS F 24H UR-MRATE: 17.4 UG/D
CORTIS F/CREAT 24H UR: 10.84 UG/G CRT
CREAT 24H UR-MRATE: 1608 MG/D
CREAT UR-MCNC: 67 MG/DL

## 2021-07-23 PROCEDURE — 99214 OFFICE O/P EST MOD 30 MIN: CPT | Mod: 95 | Performed by: INTERNAL MEDICINE

## 2021-07-23 RX ORDER — METFORMIN HCL 500 MG
1000 TABLET, EXTENDED RELEASE 24 HR ORAL
Qty: 60 TABLET | Refills: 5 | Status: SHIPPED | OUTPATIENT
Start: 2021-07-23 | End: 2022-08-17

## 2021-07-23 NOTE — PROGRESS NOTES
Patient is being evaluated via a billable video visit.      How would you like to obtain your AVS? Reviewed verbally  If the video visit is dropped, the invitation should be resent by cell phone  Will anyone else be joining your video visit? no      Video Start Time: 3:00 pm    Video-Visit Details    Type of service:  Video Visit    Video End Time: 3:25 pm    Originating Location (pt. Location): home    Distant Location (provider location):  [facility]/home    Platform used for Video Visit:  [facility]      Patient is being evaluated via a billable video visit.      How would you like to obtain your AVS? Reviewed verbally  If the video visit is dropped, the invitation should be resent by cell phone  Will anyone else be joining your video visit? no      Video Start Time:  2:10 pm    Video-Visit Details    Type of service:  Video Visit    Video End Time: 2:35 pm    Originating Location (pt. Location): home    Distant Location (provider location):  [facility] [place]/home    Platform used for Video Visit:  Rob      Recent issues:  Pituitary follow-up evaluation  Previous history of a pituitary nodule vs cyst  Reviewed medical history from patient and Epic chart record        2017. History of mild hyperprolactinemia  Details of initial symptoms, testing unclear  Had seen Dr. 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 Endocrinology consult with Dr. Francisco Lyons/[facility] office  Details of endocrinology evaluation and progress notes not available here  2/4/17 labs included normal IGF1 242, cortisol 10.3, DHEAS 207, FT4 1.14, TSH 2.8 and the GH <0.1  Patient recalls discussion of med but none used, has not used medication to lower prolactin levels    7/10/19 Head MRI with/without contrast:   3 mm T2  hyperintense nonenhancing lesion in sella turcica, no change in size    Previous [facility] labs include:          7/7/21. Initial endocrinology evaluation with me at [place]  Reviewed health history and endocrine issues  Patient symptoms included:   Easy skin bruising   Pain and swelling at all joints   Increased hair growth- arms, legs, face, chest   Scalp hair thinning   Worsening facial acne   Migraine headaches   Breast tenderness but no nipple discharge   Heart racing   Photosensitivity   Irreg menses 21-35 days frequency    Recent [facility] labs include:  Lab Results   Component Value Date    PROLACTIN 22 07/14/2021    TESTOSTTOTAL 16 07/14/2021    BURAK 10.5 07/14/2021    TSH 5.40 (H) 07/14/2021    T4 0.89 07/14/2021    SG 1.015 07/24/2014     Lab Results   Component Value Date     06/05/2021    POTASSIUM 3.9 06/05/2021    CHLORIDE 108 06/05/2021    CO2 25 06/05/2021    ANIONGAP 8 06/05/2021     (H) 07/14/2021    BUN 8 06/05/2021    CR 0.70 06/05/2021    GFRESTIMATED >90 06/05/2021    GFRESTBLACK >90 06/05/2021    ARMOND 8.5 06/05/2021    TSH 5.40 (H) 07/14/2021    VITDT 22 07/14/2021         Lives in [place], [place]  Sees Anjana Peng CNP for general medicine evaluations.    PMH/PSH:  Past Medical History:   Diagnosis Date     Allergic rhinitis      Anxiety      Depressive disorder      YONATAN (obstructive sleep apnea)      Pituitary adenoma (H)      Uncomplicated asthma      Past Surgical History:   Procedure Laterality Date     C LAP OVARIAN CYSTOTOMY  2007       Family Hx:  Family History   Problem Relation Age of Onset     Hypertension Mother      Hyperlipidemia Mother      Diabetes Mother 40     Depression Mother      Asthma Mother      Obesity Mother 40     Hypertension Father      Hyperlipidemia Father      Asthma Father      Prostate Cancer Maternal Grandmother 50        lukemia     Prostate Cancer Paternal Grandmother 65        stomack Cancer     Asthma Brother          Social Hx:  Social History      Socioeconomic History     Marital status: Single     Spouse name: Not on file     Number of children: Not on file     Years of education: Not on file     Highest education level: Not on file   Occupational History     Not on file   Tobacco Use     Smoking status: Never Smoker     Smokeless tobacco: Never Used   Substance and Sexual Activity     Alcohol use: No     Alcohol/week: 0.0 standard drinks     Drug use: No     Sexual activity: Yes   Other Topics Concern     Parent/sibling w/ CABG, MI or angioplasty before 65F 55M? Not Asked   Social History Narrative     Not on file     Social Determinants of Health     Financial Resource Strain:      Difficulty of Paying Living Expenses:    Food Insecurity:      Worried About Running Out of Food in the Last Year:      Ran Out of Food in the Last Year:    Transportation Needs:      Lack of Transportation (Medical):      Lack of Transportation (Non-Medical):    Physical Activity:      Days of Exercise per Week:      Minutes of Exercise per Session:    Stress:      Feeling of Stress :    Social Connections:      Frequency of Communication with Friends and Family:      Frequency of Social Gatherings with Friends and Family:      Attends Hinduism Services:      Active Member of Clubs or Organizations:      Attends Club or Organization Meetings:      Marital Status:    Intimate Partner Violence:      Fear of Current or Ex-Partner:      Emotionally Abused:      Physically Abused:      Sexually Abused:           MEDICATIONS:  has a current medication list which includes the following prescription(s): metformin, acetaminophen, albuterol, band-it forearm band, epinephrine, ibuprofen, loratadine, melatonin, naproxen sodium, and tizanidine.    ROS:     ROS: 10 point ROS neg other than the symptoms noted above in the HPI.    GENERAL: fatigue, wt gain; denies fevers, chills, night sweats.    HEENT: no dysphagia, odonophagia, diplopia, neck pain  THYROID:  no apparent hyper or  hypothyroid symptoms  BREASTS:  tenderness, no nipple discharge  CV: heart racing; no chest pain, pressure, palpitations  LUNGS: no SOB, VILLARREAL, cough, wheezing   ABDOMEN: no diarrhea, constipation, abdominal pain  EXTREMITIES: no rashes, ulcers, edema  NEUROLOGY: headaches, denies changes in vision, tingling, extremitiy numbness   MSK: multiple joint aches and pains; denies muscle weakness  SKIN: scalp hair thinning, hirsutism and increased body hair; no rashes or lesions  : irregular menses every 21-35 days  PSYCH:  stable mood, no significant anxiety or depression  ENDOCRINE: no heat or cold intolerance    Physical Exam (visual exam)  VS:  no vital signs taken for video visit  CONSTITUTIONAL: healthy, alert and NAD, well dressed, answering questions appropriately  ENT: no nose swelling or nasal discharge, mouth redness or gum changes.  EYES: eyes grossly normal to inspection, conjunctivae and sclerae normal, no exophthalmos or proptosis  THYROID:  no apparent nodules or goiter  LUNGS: no audible wheeze, cough or visible cyanosis, no visible retractions or increased work of breathing  ABDOMEN: abdomen not evaluated  EXTREMITIES: no hand tremors, limited exam  NEUROLOGY: CN grossly intact, mentation intact and speech normal   SKIN:  no apparent skin lesions, rash, or edema with visualized skin appearance  PSYCH: mentation appears normal, affect normal/bright, judgement and insight intact,   normal speech and appearance well groomed      LABS:    All pertinent notes, labs, and images personally reviewed by me.     A/P:  Encounter Diagnoses   Name Primary?     PCOS (polycystic ovarian syndrome) Yes     History of hyperprolactinemia      Vitamin D insufficiency      Subclinical hypothyroidism      Class 2 obesity due to excess calories with body mass index (BMI) of 36.0 to 36.9 in adult, unspecified whether serious comorbidity present      Prediabetes         Comments:   Reviewed health history, pituitary, and possible  endocrine issues  Small pituitary nodule may represent microprolactinoma, but recent normal prolactin level reassuring  Suspect mild PCOS  Reviewed and interpreted tests that I previously ordered.   Ordered appropriate tests for the endocrinology disease management.    Management options discussed and implemented after shared medical decision making with the patient.  PCOS and hypothyroidism problems are chronic with exacerbation progression    Plan:  Discussed general issues with pituitary evaluation and management  Reviewed diagnostic features of PCOS, association with obesity and insulin resistance  We discussed clinical and biochemical features of hyperandrogenism, lab testing.  Reviewed plan for calorie restriction, wt loss, exercise to improve insulin resistance  Discussed treatment option of off-label metformin as an insulin-sensitizer medication.    Recommend:  Advised starting metforminER medication to see if effective for menses cycling, hyperandrogenism symptoms   Reviewed metformin med use, potential benefits and SE's   Start 500 mg as 1-tab daily for 5-7 days, then 2-tabs daily  Monitor for symptom changes  Keep focus on diet, exercise, weight management  Add additional vit D 1000 U tablet daily  Discussed future option of using low dose levothyroxine, if indicated  Repeat [facility] lab appt in late 9/2021   Lab orders placed  No pituitary MRI needed at this time  Will review labs, imaging information at follow-up evaluation soon  Keep focus on diet, exercise, weight management.  Follow-up endocrinology evaluation in 11/2021... call our office to schedule     See PCP if symptoms persist  Addressed patient questions today    There are no Patient Instructions on file for this visit.    Future labs ordered today:   Orders Placed This Encounter   Procedures     Vitamin D Deficiency     TSH with free T4 reflex     Hemoglobin A1c     Testosterone total     Prolactin     Radiology/Consults ordered today:  None    Total time spent in with the patient evaluation:  25 min  Additional time spent reviewing pertinent lab tests and chart notes, and documentation:  5 min    Follow-up:  11/2021    CRUZITO Stevens MD, MS  Endocrinology  [facility]    CC: Anjana Peng

## 2021-07-23 NOTE — LETTER
7/23/2021         RE: Jackeline Yeboah  [address]  [facility] [place] [address]        Dear Colleague,    Thank you for referring your patient, Jackeline Yeboah, to the [facility]. Please see a copy of my visit note below.    Patient is being evaluated via a billable video visit.      How would you like to obtain your AVS? Reviewed verbally  If the video visit is dropped, the invitation should be resent by cell phone  Will anyone else be joining your video visit? no      Video Start Time: 3:00 pm    Video-Visit Details    Type of service:  Video Visit    Video End Time: 3:25 pm    Originating Location (pt. Location): home    Distant Location (provider location):  [facility]/[place]    Platform used for Video Visit:  [facility]      Patient is being evaluated via a billable video visit.      How would you like to obtain your AVS? Reviewed verbally  If the video visit is dropped, the invitation should be resent by cell phone  Will anyone else be joining your video visit? no      Video Start Time:  2:10 pm    Video-Visit Details    Type of service:  Video Visit    Video End Time: 2:35 pm    Originating Location (pt. Location): home    Distant Location (provider location):  [facility]/home    Platform used for Video Visit:  [facility]      Recent issues:  Pituitary follow-up evaluation  Previous history of a pituitary nodule vs cyst  Reviewed medical history from patient and Epic chart record        2017. History of mild hyperprolactinemia  Details of initial symptoms, testing unclear  Had seen Dr. 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 Endocrinology consult with Dr. Francisco Lyons/[facility] office  Details of endocrinology evaluation and progress notes not available  here  2/4/17 labs included normal IGF1 242, cortisol 10.3, DHEAS 207, FT4 1.14, TSH 2.8 and the GH <0.1  Patient recalls discussion of med but none used, has not used medication to lower prolactin levels    7/10/19 Head MRI with/without contrast:   3 mm T2 hyperintense nonenhancing lesion in sella turcica, no change in size    Previous [facility] labs include:          7/7/21. Initial endocrinology evaluation with me at [place]  Reviewed health history and endocrine issues  Patient symptoms included:   Easy skin bruising   Pain and swelling at all joints   Increased hair growth- arms, legs, face, chest   Scalp hair thinning   Worsening facial acne   Migraine headaches   Breast tenderness but no nipple discharge   Heart racing   Photosensitivity   Irreg menses 21-35 days frequency    Recent [facility] labs include:  Lab Results   Component Value Date    PROLACTIN 22 07/14/2021    TESTOSTTOTAL 16 07/14/2021    BURAK 10.5 07/14/2021    TSH 5.40 (H) 07/14/2021    T4 0.89 07/14/2021    SG 1.015 07/24/2014     Lab Results   Component Value Date     06/05/2021    POTASSIUM 3.9 06/05/2021    CHLORIDE 108 06/05/2021    CO2 25 06/05/2021    ANIONGAP 8 06/05/2021     (H) 07/14/2021    BUN 8 06/05/2021    CR 0.70 06/05/2021    GFRESTIMATED >90 06/05/2021    GFRESTBLACK >90 06/05/2021    ARMOND 8.5 06/05/2021    TSH 5.40 (H) 07/14/2021    VITDT 22 07/14/2021         Lives in [place], [place]  Sees Anjana Peng CNP for general medicine evaluations.    PMH/PSH:  Past Medical History:   Diagnosis Date     Allergic rhinitis      Anxiety      Depressive disorder      YONATAN (obstructive sleep apnea)      Pituitary adenoma (H)      Uncomplicated asthma      Past Surgical History:   Procedure Laterality Date     C LAP OVARIAN CYSTOTOMY  2007       Family Hx:  Family History   Problem Relation Age of Onset     Hypertension Mother      Hyperlipidemia Mother      Diabetes Mother 40     Depression Mother      Asthma Mother      Obesity Mother 40      Hypertension Father      Hyperlipidemia Father      Asthma Father      Prostate Cancer Maternal Grandmother 50        lukemia     Prostate Cancer Paternal Grandmother 65        stomack Cancer     Asthma Brother          Social Hx:  Social History     Socioeconomic History     Marital status: Single     Spouse name: Not on file     Number of children: Not on file     Years of education: Not on file     Highest education level: Not on file   Occupational History     Not on file   Tobacco Use     Smoking status: Never Smoker     Smokeless tobacco: Never Used   Substance and Sexual Activity     Alcohol use: No     Alcohol/week: 0.0 standard drinks     Drug use: No     Sexual activity: Yes   Other Topics Concern     Parent/sibling w/ CABG, MI or angioplasty before 65F 55M? Not Asked   Social History Narrative     Not on file     Social Determinants of Health     Financial Resource Strain:      Difficulty of Paying Living Expenses:    Food Insecurity:      Worried About Running Out of Food in the Last Year:      Ran Out of Food in the Last Year:    Transportation Needs:      Lack of Transportation (Medical):      Lack of Transportation (Non-Medical):    Physical Activity:      Days of Exercise per Week:      Minutes of Exercise per Session:    Stress:      Feeling of Stress :    Social Connections:      Frequency of Communication with Friends and Family:      Frequency of Social Gatherings with Friends and Family:      Attends Restorationist Services:      Active Member of Clubs or Organizations:      Attends Club or Organization Meetings:      Marital Status:    Intimate Partner Violence:      Fear of Current or Ex-Partner:      Emotionally Abused:      Physically Abused:      Sexually Abused:           MEDICATIONS:  has a current medication list which includes the following prescription(s): metformin, acetaminophen, albuterol, band-it forearm band, epinephrine, ibuprofen, loratadine, melatonin, naproxen sodium, and  tizanidine.    ROS:     ROS: 10 point ROS neg other than the symptoms noted above in the HPI.    GENERAL: fatigue, wt gain; denies fevers, chills, night sweats.    HEENT: no dysphagia, odonophagia, diplopia, neck pain  THYROID:  no apparent hyper or hypothyroid symptoms  BREASTS:  tenderness, no nipple discharge  CV: heart racing; no chest pain, pressure, palpitations  LUNGS: no SOB, VILLARREAL, cough, wheezing   ABDOMEN: no diarrhea, constipation, abdominal pain  EXTREMITIES: no rashes, ulcers, edema  NEUROLOGY: headaches, denies changes in vision, tingling, extremitiy numbness   MSK: multiple joint aches and pains; denies muscle weakness  SKIN: scalp hair thinning, hirsutism and increased body hair; no rashes or lesions  : irregular menses every 21-35 days  PSYCH:  stable mood, no significant anxiety or depression  ENDOCRINE: no heat or cold intolerance    Physical Exam (visual exam)  VS:  no vital signs taken for video visit  CONSTITUTIONAL: healthy, alert and NAD, well dressed, answering questions appropriately  ENT: no nose swelling or nasal discharge, mouth redness or gum changes.  EYES: eyes grossly normal to inspection, conjunctivae and sclerae normal, no exophthalmos or proptosis  THYROID:  no apparent nodules or goiter  LUNGS: no audible wheeze, cough or visible cyanosis, no visible retractions or increased work of breathing  ABDOMEN: abdomen not evaluated  EXTREMITIES: no hand tremors, limited exam  NEUROLOGY: CN grossly intact, mentation intact and speech normal   SKIN:  no apparent skin lesions, rash, or edema with visualized skin appearance  PSYCH: mentation appears normal, affect normal/bright, judgement and insight intact,   normal speech and appearance well groomed      LABS:    All pertinent notes, labs, and images personally reviewed by me.     A/P:  Encounter Diagnoses   Name Primary?     PCOS (polycystic ovarian syndrome) Yes     History of hyperprolactinemia      Vitamin D insufficiency       Subclinical hypothyroidism      Class 2 obesity due to excess calories with body mass index (BMI) of 36.0 to 36.9 in adult, unspecified whether serious comorbidity present      Prediabetes         Comments:   Reviewed health history, pituitary, and possible endocrine issues  Small pituitary nodule may represent microprolactinoma, but recent normal prolactin level reassuring  Suspect mild PCOS  Reviewed and interpreted tests that I previously ordered.   Ordered appropriate tests for the endocrinology disease management.    Management options discussed and implemented after shared medical decision making with the patient.  PCOS and hypothyroidism problems are chronic with exacerbation progression    Plan:  Discussed general issues with pituitary evaluation and management  Reviewed diagnostic features of PCOS, association with obesity and insulin resistance  We discussed clinical and biochemical features of hyperandrogenism, lab testing.  Reviewed plan for calorie restriction, wt loss, exercise to improve insulin resistance  Discussed treatment option of off-label metformin as an insulin-sensitizer medication.    Recommend:  Advised starting metforminER medication to see if effective for menses cycling, hyperandrogenism symptoms   Reviewed metformin med use, potential benefits and SE's   Start 500 mg as 1-tab daily for 5-7 days, then 2-tabs daily  Monitor for symptom changes  Keep focus on diet, exercise, weight management  Add additional vit D 1000 U tablet daily  Discussed future option of using low dose levothyroxine, if indicated  Repeat [facility] lab appt in late 9/2021   Lab orders placed  No pituitary MRI needed at this time  Will review labs, imaging information at follow-up evaluation soon  Keep focus on diet, exercise, weight management.  Follow-up endocrinology evaluation in 11/2021... call our office to schedule     See PCP if symptoms persist  Addressed patient questions today    There are no Patient Instructions  on file for this visit.    Future labs ordered today:   Orders Placed This Encounter   Procedures     Vitamin D Deficiency     TSH with free T4 reflex     Hemoglobin A1c     Testosterone total     Prolactin     Radiology/Consults ordered today: None    Total time spent in with the patient evaluation:  25 min  Additional time spent reviewing pertinent lab tests and chart notes, and documentation:  5 min    Follow-up:  11/2021    CRUZITO Stevens MD, MS  Endocrinology  [facility]    CC: Anjana Peng             Again, thank you for allowing me to participate in the care of your patient.        Sincerely,        Reji Stevens MD

## 2021-08-05 DIAGNOSIS — E28.2 PCOS (POLYCYSTIC OVARIAN SYNDROME): Primary | ICD-10-CM

## 2021-08-05 DIAGNOSIS — R73.03 PREDIABETES: ICD-10-CM

## 2021-08-05 RX ORDER — PIOGLITAZONEHYDROCHLORIDE 15 MG/1
15 TABLET ORAL DAILY
Qty: 30 TABLET | Refills: 5 | Status: SHIPPED | OUTPATIENT
Start: 2021-08-05 | End: 2022-08-17

## 2021-10-03 ENCOUNTER — HEALTH MAINTENANCE LETTER (OUTPATIENT)
Age: 41
End: 2021-10-03

## 2022-01-23 ENCOUNTER — HEALTH MAINTENANCE LETTER (OUTPATIENT)
Age: 42
End: 2022-01-23

## 2022-03-29 DIAGNOSIS — J45.20 MILD INTERMITTENT ASTHMA WITHOUT COMPLICATION: ICD-10-CM

## 2022-03-29 RX ORDER — ALBUTEROL SULFATE 90 UG/1
2 AEROSOL, METERED RESPIRATORY (INHALATION) EVERY 6 HOURS
Qty: 6.7 G | Refills: 0 | Status: SHIPPED | OUTPATIENT
Start: 2022-03-29 | End: 2022-04-20

## 2022-07-01 DIAGNOSIS — G47.33 OBSTRUCTIVE SLEEP APNEA (ADULT) (PEDIATRIC): Primary | ICD-10-CM

## 2022-07-28 ENCOUNTER — OFFICE VISIT (OUTPATIENT)
Dept: URGENT CARE | Facility: URGENT CARE | Age: 42
End: 2022-07-28
Payer: COMMERCIAL

## 2022-07-28 VITALS
OXYGEN SATURATION: 97 % | DIASTOLIC BLOOD PRESSURE: 93 MMHG | TEMPERATURE: 100 F | HEART RATE: 105 BPM | SYSTOLIC BLOOD PRESSURE: 139 MMHG

## 2022-07-28 DIAGNOSIS — J40 BRONCHITIS: ICD-10-CM

## 2022-07-28 DIAGNOSIS — R50.9 FEVER, UNSPECIFIED FEVER CAUSE: Primary | ICD-10-CM

## 2022-07-28 DIAGNOSIS — Z20.822 EXPOSURE TO 2019 NOVEL CORONAVIRUS: ICD-10-CM

## 2022-07-28 LAB
BASOPHILS # BLD AUTO: 0.1 10E3/UL (ref 0–0.2)
BASOPHILS NFR BLD AUTO: 1 %
DEPRECATED S PYO AG THROAT QL EIA: NEGATIVE
EOSINOPHIL # BLD AUTO: 0.1 10E3/UL (ref 0–0.7)
EOSINOPHIL NFR BLD AUTO: 1 %
ERYTHROCYTE [DISTWIDTH] IN BLOOD BY AUTOMATED COUNT: 13.3 % (ref 10–15)
FLUAV AG SPEC QL IA: NEGATIVE
FLUBV AG SPEC QL IA: NEGATIVE
HCT VFR BLD AUTO: 39.3 % (ref 35–47)
HGB BLD-MCNC: 13.4 G/DL (ref 11.7–15.7)
IMM GRANULOCYTES # BLD: 0 10E3/UL
IMM GRANULOCYTES NFR BLD: 0 %
LYMPHOCYTES # BLD AUTO: 1.4 10E3/UL (ref 0.8–5.3)
LYMPHOCYTES NFR BLD AUTO: 15 %
MCH RBC QN AUTO: 27.7 PG (ref 26.5–33)
MCHC RBC AUTO-ENTMCNC: 34.1 G/DL (ref 31.5–36.5)
MCV RBC AUTO: 81 FL (ref 78–100)
MONOCYTES # BLD AUTO: 1 10E3/UL (ref 0–1.3)
MONOCYTES NFR BLD AUTO: 10 %
NEUTROPHILS # BLD AUTO: 7.1 10E3/UL (ref 1.6–8.3)
NEUTROPHILS NFR BLD AUTO: 74 %
PLATELET # BLD AUTO: 344 10E3/UL (ref 150–450)
RBC # BLD AUTO: 4.84 10E6/UL (ref 3.8–5.2)
WBC # BLD AUTO: 9.6 10E3/UL (ref 4–11)

## 2022-07-28 PROCEDURE — 87804 INFLUENZA ASSAY W/OPTIC: CPT | Performed by: PHYSICIAN ASSISTANT

## 2022-07-28 PROCEDURE — 87804 INFLUENZA ASSAY W/OPTIC: CPT | Mod: 59 | Performed by: PHYSICIAN ASSISTANT

## 2022-07-28 PROCEDURE — 87651 STREP A DNA AMP PROBE: CPT | Performed by: PHYSICIAN ASSISTANT

## 2022-07-28 PROCEDURE — 99214 OFFICE O/P EST MOD 30 MIN: CPT | Mod: CS | Performed by: PHYSICIAN ASSISTANT

## 2022-07-28 PROCEDURE — U0003 INFECTIOUS AGENT DETECTION BY NUCLEIC ACID (DNA OR RNA); SEVERE ACUTE RESPIRATORY SYNDROME CORONAVIRUS 2 (SARS-COV-2) (CORONAVIRUS DISEASE [COVID-19]), AMPLIFIED PROBE TECHNIQUE, MAKING USE OF HIGH THROUGHPUT TECHNOLOGIES AS DESCRIBED BY CMS-2020-01-R: HCPCS | Performed by: PHYSICIAN ASSISTANT

## 2022-07-28 PROCEDURE — 36415 COLL VENOUS BLD VENIPUNCTURE: CPT | Performed by: PHYSICIAN ASSISTANT

## 2022-07-28 PROCEDURE — U0005 INFEC AGEN DETEC AMPLI PROBE: HCPCS | Performed by: PHYSICIAN ASSISTANT

## 2022-07-28 PROCEDURE — 85025 COMPLETE CBC W/AUTO DIFF WBC: CPT | Performed by: PHYSICIAN ASSISTANT

## 2022-07-28 RX ORDER — DROSPIRENONE AND ETHINYL ESTRADIOL TABLETS 0.02-3(28)
KIT ORAL
COMMUNITY
Start: 2022-05-24

## 2022-07-28 RX ORDER — SUMATRIPTAN 100 MG/1
TABLET, FILM COATED ORAL
COMMUNITY
Start: 2022-07-03

## 2022-07-28 RX ORDER — BENZONATATE 100 MG/1
100 CAPSULE ORAL 3 TIMES DAILY PRN
Qty: 21 CAPSULE | Refills: 0 | Status: SHIPPED | OUTPATIENT
Start: 2022-07-28 | End: 2022-08-04

## 2022-07-28 RX ORDER — DESVENLAFAXINE 50 MG/1
TABLET, FILM COATED, EXTENDED RELEASE ORAL
COMMUNITY
Start: 2022-06-22

## 2022-07-28 ASSESSMENT — ENCOUNTER SYMPTOMS
SORE THROAT: 1
NAUSEA: 0
DIARRHEA: 0
ADENOPATHY: 1
EYES NEGATIVE: 1
VOMITING: 0
CHEST TIGHTNESS: 0
ABDOMINAL PAIN: 0
NUMBNESS: 0
SINUS PRESSURE: 0
SINUS PAIN: 0
DIZZINESS: 0
HEADACHES: 1
WEAKNESS: 0
COUGH: 1
RHINORRHEA: 0
MYALGIAS: 1
FATIGUE: 1
SHORTNESS OF BREATH: 0

## 2022-07-28 NOTE — PROGRESS NOTES
SUBJECTIVE:   Jackeline Yeboah is a 42 year old female presenting with a chief complaint of sore throat, myalgias, fatigue, headache, right ear pain, and dry cough. She states that 2 weeks ago she began developing an intermittent sore throat without other symptoms. She would gargle salt water and drink hot beverages with relief of symptoms. 2 days ago the sore throat became more severe and persistent and she began developing fatigue, headache, right ear pain, and a dry cough. She took a home COVID test this morning which was negative. She contacted her physician who had no available appointments and was advised to present to . She denies fevers at home, but states that her temperature has risen today from 98 to 100F. She has a history of pituitary adenoma, YONATAN, severe allergic reactions, and pneumonia. She denies ear drainage, hearing loss, tinnitus, productive cough, chest pain, shortness of breath, abdominal pain, nausea, and vomiting. She has no recent sick contacts and has not traveled recently. She has had 3 COVID vaccinations. She did not receive the flu vaccine this past season.    Chief Complaint   Patient presents with     Urgent Care     Sore throat x 2 weeks at home covid test neg.        She is an established patient of [facility].    Review of Systems   Constitutional: Positive for fatigue.   HENT: Positive for ear pain and sore throat. Negative for congestion, rhinorrhea, sinus pressure, sinus pain and sneezing.    Eyes: Negative.    Respiratory: Positive for cough. Negative for chest tightness and shortness of breath.    Cardiovascular: Negative for chest pain.   Gastrointestinal: Negative for abdominal pain, diarrhea, nausea and vomiting.   Genitourinary: Negative.    Musculoskeletal: Positive for myalgias.   Skin: Negative for rash.   Neurological: Positive for headaches. Negative for dizziness, weakness and numbness.   Hematological: Positive for adenopathy.          Past Medical History:    Diagnosis Date     Allergic rhinitis      Anxiety      Depressive disorder      YONATAN (obstructive sleep apnea)      Pituitary adenoma (H)      Uncomplicated asthma      Family History   Problem Relation Age of Onset     Hypertension Mother      Hyperlipidemia Mother      Diabetes Mother 40     Depression Mother      Asthma Mother      Obesity Mother 40     Hypertension Father      Hyperlipidemia Father      Asthma Father      Prostate Cancer Maternal Grandmother 50        lukemia     Prostate Cancer Paternal Grandmother 65        stomack Cancer     Asthma Brother      Current Outpatient Medications   Medication Sig Dispense Refill     ACETAMINOPHEN PO Take 325 mg by mouth as needed for pain or fever       albuterol (PROAIR HFA/PROVENTIL HFA/VENTOLIN HFA) 108 (90 Base) MCG/ACT inhaler INHALE 2 PUFFS INTO THE LUNGS EVERY 6 HOURS 18 g 1     benzonatate (TESSALON) 100 MG capsule Take 1 capsule (100 mg) by mouth 3 times daily as needed for cough 21 capsule 0     desvenlafaxine (PRISTIQ) 50 MG 24 hr tablet        Elastic Bandages & Supports (BAND-IT FOREARM BAND) MISC Use on right forearm for the treatment of epicondylitis 1 each 0     EPINEPHrine (ADRENACLICK JR) 0.15 MG/0.15ML injection 2-pack Inject 0.15 mLs (0.15 mg) into the muscle as needed for anaphylaxis 2 each 3     IBUPROFEN PO Take 200 mg by mouth as needed for moderate pain or fever       loratadine (CLARITIN) 10 MG tablet Take 10 mg by mouth daily       LORYNA 3-0.02 MG tablet        melatonin 3 MG tablet Take 1 mg by mouth nightly as needed for sleep       Naproxen Sodium (ALEVE PO)        SUMAtriptan (IMITREX) 100 MG tablet TAKE 1 TABLET BY MOUTH AT ONSET OF MIGRAINE. OK TO REPEAT IN 2 HOURS. DO NOT TAKE MORE THAN 2 TABLETS IN 24 HOURS       metFORMIN (GLUCOPHAGE-XR) 500 MG 24 hr tablet Take 2 tablets (1,000 mg) by mouth daily (with dinner) (Patient not taking: Reported on 7/28/2022) 60 tablet 5     pioglitazone (ACTOS) 15 MG tablet Take 1 tablet (15 mg)  by mouth daily (Patient not taking: Reported on 7/28/2022) 30 tablet 5     tiZANidine (ZANAFLEX) 4 MG tablet Take 1 tablet (4 mg) by mouth 3 times daily (Patient not taking: Reported on 7/28/2022) 30 tablet 0     Social History     Tobacco Use     Smoking status: Never Smoker     Smokeless tobacco: Never Used   Substance Use Topics     Alcohol use: No     Alcohol/week: 0.0 standard drinks       OBJECTIVE  BP (!) 139/93 (BP Location: Right arm, Patient Position: Sitting, Cuff Size: Adult Large)   Pulse 105   Temp 100  F (37.8  C) (Oral)   SpO2 97%   Breastfeeding No     Physical Exam  Constitutional:       General: She is not in acute distress.     Appearance: She is normal weight. She is not toxic-appearing.   HENT:      Head: Normocephalic and atraumatic.      Right Ear: Tympanic membrane, ear canal and external ear normal.      Left Ear: Tympanic membrane, ear canal and external ear normal.      Nose: No congestion.      Mouth/Throat:      Mouth: Mucous membranes are moist.      Pharynx: Oropharynx is clear. No oropharyngeal exudate or posterior oropharyngeal erythema.   Eyes:      Extraocular Movements: Extraocular movements intact.      Conjunctiva/sclera: Conjunctivae normal.      Pupils: Pupils are equal, round, and reactive to light.   Cardiovascular:      Rate and Rhythm: Regular rhythm. Tachycardia present.      Pulses: Normal pulses.      Heart sounds: Normal heart sounds.   Pulmonary:      Effort: Pulmonary effort is normal. No respiratory distress.      Breath sounds: Normal breath sounds. No wheezing, rhonchi or rales.   Chest:   Breasts:      Right: Axillary adenopathy present.       Abdominal:      General: Abdomen is flat.      Palpations: Abdomen is soft.   Musculoskeletal:         General: Normal range of motion.      Cervical back: Normal range of motion and neck supple.   Lymphadenopathy:      Head:      Right side of head: No posterior auricular or occipital adenopathy.      Left side of  head: No posterior auricular or occipital adenopathy.      Cervical: No cervical adenopathy.      Upper Body:      Right upper body: Axillary adenopathy present.   Skin:     General: Skin is warm and dry.   Neurological:      General: No focal deficit present.      Mental Status: She is alert.       Labs:  Results for orders placed or performed in visit on 07/28/22 (from the past 24 hour(s))   Streptococcus A Rapid Screen w/Reflex to PCR - Clinic Collect    Specimen: Throat; Swab   Result Value Ref Range    Group A Strep antigen Negative Negative   Influenza A & B Antigen - Clinic Collect    Specimen: Nose; Swab   Result Value Ref Range    Influenza A antigen Negative Negative    Influenza B antigen Negative Negative    Narrative    Test results must be correlated with clinical data. If necessary, results should be confirmed by a molecular assay or viral culture.   CBC with platelets and differential    Narrative    The following orders were created for panel order CBC with platelets and differential.  Procedure                               Abnormality         Status                     ---------                               -----------         ------                     CBC with platelets and d...[[id number]]                      Final result                 Please view results for these tests on the individual orders.   CBC with platelets and differential   Result Value Ref Range    WBC Count 9.6 4.0 - 11.0 10e3/uL    RBC Count 4.84 3.80 - 5.20 10e6/uL    Hemoglobin 13.4 11.7 - 15.7 g/dL    Hematocrit 39.3 35.0 - 47.0 %    MCV 81 78 - 100 fL    MCH 27.7 26.5 - 33.0 pg    MCHC 34.1 31.5 - 36.5 g/dL    RDW 13.3 10.0 - 15.0 %    Platelet Count 344 150 - 450 10e3/uL    % Neutrophils 74 %    % Lymphocytes 15 %    % Monocytes 10 %    % Eosinophils 1 %    % Basophils 1 %    % Immature Granulocytes 0 %    Absolute Neutrophils 7.1 1.6 - 8.3 10e3/uL    Absolute Lymphocytes 1.4 0.8 - 5.3 10e3/uL    Absolute Monocytes 1.0  0.0 - 1.3 10e3/uL    Absolute Eosinophils 0.1 0.0 - 0.7 10e3/uL    Absolute Basophils 0.1 0.0 - 0.2 10e3/uL    Absolute Immature Granulocytes 0.0 <=0.4 10e3/uL       X-Ray was not done.    ASSESSMENT:      ICD-10-CM    1. Fever, unspecified fever cause  R50.9 Streptococcus A Rapid Screen w/Reflex to PCR - Clinic Collect     Symptomatic; Yes; 7/26/2022 COVID-19 Virus (Coronavirus) by PCR Nasopharyngeal     Influenza A & B Antigen - Clinic Collect     Group A Streptococcus PCR Throat Swab     CBC with platelets and differential     CBC with platelets and differential   2. Exposure to 2019 novel coronavirus  Z20.822    3. Bronchitis  J40 benzonatate (TESSALON) 100 MG capsule        Medical Decision Making:    Differential Diagnosis:  Strep Pharyngitis vs COVID vs Influenza vs viral illness    Serious Comorbid Conditions:  Reviewed     PLAN:    Strep and influenza swabs negative. COVID swab pending. CBC without evidence of leukocytosis or left shift. Offered chest Xray which patient declined at this time. Tessalon pearls provided for cough management. Symptomatic management discussed including COVID quarantine precautions pending swab results. Strict return to care precautions reviewed with patient.    Followup:    If not improving or if condition worsens, follow up with your Primary Care Provider, If not improving or if conditions worsens over the next 12-24 hours, go to the Emergency Department    There are no Patient Instructions on file for this visit.

## 2022-07-29 LAB
GROUP A STREP BY PCR: NOT DETECTED
SARS-COV-2 RNA RESP QL NAA+PROBE: POSITIVE

## 2022-07-31 ENCOUNTER — OFFICE VISIT (OUTPATIENT)
Dept: URGENT CARE | Facility: URGENT CARE | Age: 42
End: 2022-07-31
Payer: COMMERCIAL

## 2022-07-31 VITALS
TEMPERATURE: 98.2 F | OXYGEN SATURATION: 99 % | SYSTOLIC BLOOD PRESSURE: 124 MMHG | RESPIRATION RATE: 18 BRPM | HEART RATE: 103 BPM | DIASTOLIC BLOOD PRESSURE: 84 MMHG

## 2022-07-31 DIAGNOSIS — U07.1 INFECTION DUE TO 2019 NOVEL CORONAVIRUS: Primary | ICD-10-CM

## 2022-07-31 PROCEDURE — 99213 OFFICE O/P EST LOW 20 MIN: CPT | Mod: CS | Performed by: FAMILY MEDICINE

## 2022-07-31 RX ORDER — GUAIFENESIN 600 MG/1
1200 TABLET, EXTENDED RELEASE ORAL 2 TIMES DAILY
Qty: 28 TABLET | Refills: 0 | Status: SHIPPED | OUTPATIENT
Start: 2022-07-31 | End: 2022-08-07

## 2022-07-31 NOTE — PROGRESS NOTES
SUBJECTIVE: Jackeline Yeboah is a 42 year old female presenting with a chief complaint of cough .  Onset of symptoms was 3 day(s) ago.  Course of illness is worsening.    Positive covid test    Past Medical History:   Diagnosis Date     Allergic rhinitis      Anxiety      Depressive disorder      YONATAN (obstructive sleep apnea)      Pituitary adenoma (H)      Uncomplicated asthma      Allergies   Allergen Reactions     Apple Shortness Of Breath     Red apples     Corn Syrup [Glucose] Shortness Of Breath     Grass Shortness Of Breath     No Clinical Screening - See Comments Rash     Sun - skin reaction     Dairy Products [Milk Protein Extract] Other (See Comments) and GI Disturbance     Joint pain     Dust Mite Extract Difficulty breathing     Trees Difficulty breathing     Dogs Rash     Latex Rash     Peanuts [Nuts] Rash     Social History     Tobacco Use     Smoking status: Never Smoker     Smokeless tobacco: Never Used   Substance Use Topics     Alcohol use: No     Alcohol/week: 0.0 standard drinks       ROS:  SKIN: no rash  GI: no vomiting    OBJECTIVE:  /84   Pulse 103   Temp 98.2  F (36.8  C)   Resp 18   SpO2 99% GENERAL APPEARANCE: healthy, alert and no distress  EYES: EOMI,  PERRL, conjunctiva clear  HENT: ear canals and TM's normal.  Nose and mouth without ulcers, erythema or lesions  RESP: lungs clear to auscultation - no rales, rhonchi or wheezes  SKIN: no suspicious lesions or rashes      ICD-10-CM    1. Infection due to 2019 novel coronavirus  U07.1 nirmatrelvir and ritonavir (PAXLOVID) therapy pack     guaiFENesin (MUCINEX) 600 MG 12 hr tablet     Cont tessalon  Fluids/Rest, f/u if worse/not any better

## 2022-08-09 ENCOUNTER — OFFICE VISIT (OUTPATIENT)
Dept: URGENT CARE | Facility: URGENT CARE | Age: 42
End: 2022-08-09
Payer: COMMERCIAL

## 2022-08-09 VITALS
SYSTOLIC BLOOD PRESSURE: 130 MMHG | BODY MASS INDEX: 35.7 KG/M2 | WEIGHT: 208 LBS | DIASTOLIC BLOOD PRESSURE: 89 MMHG | TEMPERATURE: 98.3 F | OXYGEN SATURATION: 99 % | HEART RATE: 107 BPM

## 2022-08-09 DIAGNOSIS — J45.901 EXACERBATION OF ASTHMA, UNSPECIFIED ASTHMA SEVERITY, UNSPECIFIED WHETHER PERSISTENT: Primary | ICD-10-CM

## 2022-08-09 PROCEDURE — 99214 OFFICE O/P EST MOD 30 MIN: CPT | Performed by: FAMILY MEDICINE

## 2022-08-09 RX ORDER — PREDNISONE 20 MG/1
20 TABLET ORAL 2 TIMES DAILY
Qty: 10 TABLET | Refills: 0 | Status: SHIPPED | OUTPATIENT
Start: 2022-08-09 | End: 2022-08-14

## 2022-08-09 NOTE — PROGRESS NOTES
SUBJECTIVE: Jackeline Yeboah is a 42 year old female presenting with a chief complaint of cough/asthma  .  Onset of symptoms was day(s) ago.  Predisposing factors include HX of asthma and covid last month.    Past Medical History:   Diagnosis Date     Allergic rhinitis      Anxiety      Depressive disorder      YONATAN (obstructive sleep apnea)      Pituitary adenoma (H)      Uncomplicated asthma      Allergies   Allergen Reactions     Apple Shortness Of Breath     Red apples     Corn Syrup [Glucose] Shortness Of Breath     Grass Shortness Of Breath     No Clinical Screening - See Comments Rash     Sun - skin reaction     Dairy Products [Milk Protein Extract] Other (See Comments) and GI Disturbance     Joint pain     Dust Mite Extract Difficulty breathing     Trees Difficulty breathing     Dogs Rash     Latex Rash     Peanuts [Nuts] Rash     Social History     Tobacco Use     Smoking status: Never Smoker     Smokeless tobacco: Never Used   Substance Use Topics     Alcohol use: No     Alcohol/week: 0.0 standard drinks       ROS:  SKIN: no rash  GI: no vomiting    OBJECTIVE:  /89   Pulse 107   Temp 98.3  F (36.8  C) (Tympanic)   Wt 94.3 kg (208 lb)   SpO2 99%   BMI 35.70 kg/m  GENERAL APPEARANCE: healthy, alert and no distress  EYES: EOMI,  PERRL, conjunctiva clear  HENT: ear canals and TM's normal.  Nose and mouth without ulcers, erythema or lesions  RESP: lungs clear to auscultation - no rales, rhonchi or wheezes  SKIN: no suspicious lesions or rashes      ICD-10-CM    1. Exacerbation of asthma, unspecified asthma severity, unspecified whether persistent  J45.901 predniSONE (DELTASONE) 20 MG tablet     Use inh qid  Fluids/Rest, f/u if worse/not any better

## 2022-08-17 ENCOUNTER — OFFICE VISIT (OUTPATIENT)
Dept: URGENT CARE | Facility: URGENT CARE | Age: 42
End: 2022-08-17
Payer: COMMERCIAL

## 2022-08-17 VITALS
DIASTOLIC BLOOD PRESSURE: 86 MMHG | BODY MASS INDEX: 35.7 KG/M2 | WEIGHT: 208 LBS | TEMPERATURE: 97.7 F | HEART RATE: 51 BPM | SYSTOLIC BLOOD PRESSURE: 133 MMHG | OXYGEN SATURATION: 97 %

## 2022-08-17 DIAGNOSIS — U09.9 POST-COVID-19 SYNDROME MANIFESTING AS CHRONIC COUGH: Primary | ICD-10-CM

## 2022-08-17 DIAGNOSIS — R05.3 POST-COVID-19 SYNDROME MANIFESTING AS CHRONIC COUGH: Primary | ICD-10-CM

## 2022-08-17 LAB
BASOPHILS # BLD AUTO: 0 10E3/UL (ref 0–0.2)
BASOPHILS NFR BLD AUTO: 0 %
EOSINOPHIL # BLD AUTO: 0.2 10E3/UL (ref 0–0.7)
EOSINOPHIL NFR BLD AUTO: 2 %
ERYTHROCYTE [DISTWIDTH] IN BLOOD BY AUTOMATED COUNT: 12.8 % (ref 10–15)
HCT VFR BLD AUTO: 40.8 % (ref 35–47)
HGB BLD-MCNC: 13.7 G/DL (ref 11.7–15.7)
IMM GRANULOCYTES # BLD: 0 10E3/UL
IMM GRANULOCYTES NFR BLD: 0 %
LYMPHOCYTES # BLD AUTO: 3.9 10E3/UL (ref 0.8–5.3)
LYMPHOCYTES NFR BLD AUTO: 33 %
MCH RBC QN AUTO: 27.7 PG (ref 26.5–33)
MCHC RBC AUTO-ENTMCNC: 33.6 G/DL (ref 31.5–36.5)
MCV RBC AUTO: 83 FL (ref 78–100)
MONOCYTES # BLD AUTO: 0.8 10E3/UL (ref 0–1.3)
MONOCYTES NFR BLD AUTO: 7 %
NEUTROPHILS # BLD AUTO: 6.8 10E3/UL (ref 1.6–8.3)
NEUTROPHILS NFR BLD AUTO: 58 %
PLATELET # BLD AUTO: 330 10E3/UL (ref 150–450)
RBC # BLD AUTO: 4.94 10E6/UL (ref 3.8–5.2)
WBC # BLD AUTO: 11.7 10E3/UL (ref 4–11)

## 2022-08-17 PROCEDURE — 85025 COMPLETE CBC W/AUTO DIFF WBC: CPT

## 2022-08-17 PROCEDURE — 36415 COLL VENOUS BLD VENIPUNCTURE: CPT

## 2022-08-17 PROCEDURE — 99213 OFFICE O/P EST LOW 20 MIN: CPT | Mod: CS

## 2022-08-17 RX ORDER — PREDNISONE 5 MG/1
5 TABLET ORAL DAILY
Qty: 6 TABLET | Refills: 0 | Status: SHIPPED | OUTPATIENT
Start: 2022-08-17 | End: 2022-08-23

## 2022-08-17 ASSESSMENT — ENCOUNTER SYMPTOMS
FEVER: 1
COUGH: 1
HEADACHES: 1

## 2022-08-17 NOTE — PROGRESS NOTES
HPI  42-year-old female presents 2 weeks post COVID with persistent cough, myalgias, headache, and today had a return of low-grade fever.  She has been using her albuterol intermittently and finds this controls the cough and any symptoms of shortness of breath.  She states the fatigue is the most bothersome at this point.  She has taken a subsequent COVID test and it has negative results.    Review of Systems   Constitutional: Positive for fever and malaise/fatigue.   Respiratory: Positive for cough.    Neurological: Positive for headaches.      ROS: 10 point ROS neg other than the symptoms noted above in the HPI.    Physical Exam  Vitals and nursing note reviewed.   Constitutional:       General: She is not in acute distress.     Comments: /86   Pulse 51   Temp 97.7  F (36.5  C) (Oral)   Wt 94.3 kg (208 lb)   SpO2 97%   BMI 35.70 kg/m       HENT:      Head: Normocephalic.      Right Ear: Tympanic membrane normal.      Left Ear: Tympanic membrane normal.      Nose: Nose normal.      Mouth/Throat:      Mouth: Mucous membranes are moist.      Pharynx: No posterior oropharyngeal erythema.   Eyes:      Conjunctiva/sclera: Conjunctivae normal.   Cardiovascular:      Rate and Rhythm: Normal rate.      Heart sounds: Normal heart sounds.   Pulmonary:      Effort: Pulmonary effort is normal.      Breath sounds: Normal breath sounds.   Abdominal:      Tenderness: There is no abdominal tenderness.   Musculoskeletal:         General: Normal range of motion.   Skin:     General: Skin is warm and dry.      Capillary Refill: Capillary refill takes less than 2 seconds.   Neurological:      Mental Status: She is alert and oriented to person, place, and time.       Results for orders placed or performed in visit on 08/17/22   XR Chest 2 Views     Status: None    Narrative    EXAM: XR CHEST 2 VIEWS  LOCATION: [facility] [place]  DATE/TIME: 8/17/2022 5:55 PM    INDICATION: Post covid, now cough worsening  with some asthma sx  COMPARISON: None.      Impression    IMPRESSION: Negative chest.   CBC with platelets and differential     Status: Abnormal   Result Value Ref Range    WBC Count 11.7 (H) 4.0 - 11.0 10e3/uL    RBC Count 4.94 3.80 - 5.20 10e6/uL    Hemoglobin 13.7 11.7 - 15.7 g/dL    Hematocrit 40.8 35.0 - 47.0 %    MCV 83 78 - 100 fL    MCH 27.7 26.5 - 33.0 pg    MCHC 33.6 31.5 - 36.5 g/dL    RDW 12.8 10.0 - 15.0 %    Platelet Count 330 150 - 450 10e3/uL    % Neutrophils 58 %    % Lymphocytes 33 %    % Monocytes 7 %    % Eosinophils 2 %    % Basophils 0 %    % Immature Granulocytes 0 %    Absolute Neutrophils 6.8 1.6 - 8.3 10e3/uL    Absolute Lymphocytes 3.9 0.8 - 5.3 10e3/uL    Absolute Monocytes 0.8 0.0 - 1.3 10e3/uL    Absolute Eosinophils 0.2 0.0 - 0.7 10e3/uL    Absolute Basophils 0.0 0.0 - 0.2 10e3/uL    Absolute Immature Granulocytes 0.0 <=0.4 10e3/uL   CBC with platelets and differential     Status: Abnormal    Narrative    The following orders were created for panel order CBC with platelets and differential.  Procedure                               Abnormality         Status                     ---------                               -----------         ------                     CBC with platelets and d...[[id number]]  Abnormal            Final result                 Please view results for these tests on the individual orders.     Assessment:  1. 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   Written instructions provided in after visit summary and reviewed.  Patient instructed to see primary care provider for new or persistent symptoms.   Red flag symptoms reviewed  and patient has been instructed to seek emergent care  Please contact pharmacy for medication questions.  Patient instructed to take medications as directed on package.    Continue other medications as previously prescribed.    The use of Dragon/[employer] dictation services may have been used to construct the content in this note;   any grammatical or spelling errors are non-intentional. Please contact the author of this note directly if you   are in need of any clarification.   Cecily Henriquez, DNP, APRN, CNP

## 2022-09-10 ENCOUNTER — HEALTH MAINTENANCE LETTER (OUTPATIENT)
Age: 42
End: 2022-09-10

## 2022-10-14 ENCOUNTER — HOSPITAL ENCOUNTER (EMERGENCY)
Facility: CLINIC | Age: 42
Discharge: HOME | End: 2022-10-14
Attending: EMERGENCY MEDICINE | Admitting: EMERGENCY MEDICINE
Payer: COMMERCIAL

## 2022-10-14 ENCOUNTER — APPOINTMENT (OUTPATIENT)
Dept: GENERAL RADIOLOGY | Facility: CLINIC | Age: 42
End: 2022-10-14
Attending: EMERGENCY MEDICINE
Payer: COMMERCIAL

## 2022-10-14 VITALS
DIASTOLIC BLOOD PRESSURE: 97 MMHG | TEMPERATURE: 98.2 F | HEART RATE: 112 BPM | RESPIRATION RATE: 12 BRPM | BODY MASS INDEX: 35.7 KG/M2 | OXYGEN SATURATION: 98 % | WEIGHT: 208 LBS | SYSTOLIC BLOOD PRESSURE: 154 MMHG

## 2022-10-14 DIAGNOSIS — H93.13 TINNITUS, BILATERAL: ICD-10-CM

## 2022-10-14 DIAGNOSIS — R00.2 PALPITATIONS: ICD-10-CM

## 2022-10-14 LAB
ANION GAP SERPL CALCULATED.3IONS-SCNC: 7 MMOL/L (ref 3–14)
ATRIAL RATE - MUSE: 103 BPM
BASOPHILS # BLD AUTO: 0.1 10E3/UL (ref 0–0.2)
BASOPHILS NFR BLD AUTO: 1 %
BUN SERPL-MCNC: 11 MG/DL (ref 7–30)
CALCIUM SERPL-MCNC: 8.4 MG/DL (ref 8.5–10.1)
CHLORIDE BLD-SCNC: 107 MMOL/L (ref 94–109)
CO2 SERPL-SCNC: 24 MMOL/L (ref 20–32)
CREAT SERPL-MCNC: 0.53 MG/DL (ref 0.52–1.04)
D DIMER PPP FEU-MCNC: 0.39 UG/ML FEU (ref 0–0.5)
DIASTOLIC BLOOD PRESSURE - MUSE: NORMAL MMHG
EOSINOPHIL # BLD AUTO: 0.1 10E3/UL (ref 0–0.7)
EOSINOPHIL NFR BLD AUTO: 1 %
ERYTHROCYTE [DISTWIDTH] IN BLOOD BY AUTOMATED COUNT: 13.1 % (ref 10–15)
GFR SERPL CREATININE-BSD FRML MDRD: >90 ML/MIN/1.73M2
GLUCOSE BLD-MCNC: 135 MG/DL (ref 70–99)
HCT VFR BLD AUTO: 43.1 % (ref 35–47)
HGB BLD-MCNC: 14.2 G/DL (ref 11.7–15.7)
HOLD SPECIMEN: NORMAL
IMM GRANULOCYTES # BLD: 0 10E3/UL
IMM GRANULOCYTES NFR BLD: 0 %
INTERPRETATION ECG - MUSE: NORMAL
LYMPHOCYTES # BLD AUTO: 3.6 10E3/UL (ref 0.8–5.3)
LYMPHOCYTES NFR BLD AUTO: 42 %
MCH RBC QN AUTO: 27.4 PG (ref 26.5–33)
MCHC RBC AUTO-ENTMCNC: 32.9 G/DL (ref 31.5–36.5)
MCV RBC AUTO: 83 FL (ref 78–100)
MONOCYTES # BLD AUTO: 0.8 10E3/UL (ref 0–1.3)
MONOCYTES NFR BLD AUTO: 9 %
NEUTROPHILS # BLD AUTO: 3.9 10E3/UL (ref 1.6–8.3)
NEUTROPHILS NFR BLD AUTO: 47 %
NRBC # BLD AUTO: 0 10E3/UL
NRBC BLD AUTO-RTO: 0 /100
P AXIS - MUSE: 43 DEGREES
PLATELET # BLD AUTO: 308 10E3/UL (ref 150–450)
POTASSIUM BLD-SCNC: 3.6 MMOL/L (ref 3.4–5.3)
PR INTERVAL - MUSE: 170 MS
QRS DURATION - MUSE: 70 MS
QT - MUSE: 344 MS
QTC - MUSE: 450 MS
R AXIS - MUSE: -13 DEGREES
RBC # BLD AUTO: 5.19 10E6/UL (ref 3.8–5.2)
SODIUM SERPL-SCNC: 138 MMOL/L (ref 133–144)
SYSTOLIC BLOOD PRESSURE - MUSE: NORMAL MMHG
T AXIS - MUSE: 14 DEGREES
TROPONIN I SERPL HS-MCNC: 6 NG/L
VENTRICULAR RATE- MUSE: 103 BPM
WBC # BLD AUTO: 8.5 10E3/UL (ref 4–11)

## 2022-10-14 PROCEDURE — 93005 ELECTROCARDIOGRAM TRACING: CPT

## 2022-10-14 PROCEDURE — 93005 ELECTROCARDIOGRAM TRACING: CPT | Mod: 76

## 2022-10-14 PROCEDURE — 80048 BASIC METABOLIC PNL TOTAL CA: CPT | Performed by: EMERGENCY MEDICINE

## 2022-10-14 PROCEDURE — 71046 X-RAY EXAM CHEST 2 VIEWS: CPT

## 2022-10-14 PROCEDURE — 85379 FIBRIN DEGRADATION QUANT: CPT | Performed by: EMERGENCY MEDICINE

## 2022-10-14 PROCEDURE — 85004 AUTOMATED DIFF WBC COUNT: CPT | Performed by: EMERGENCY MEDICINE

## 2022-10-14 PROCEDURE — 99285 EMERGENCY DEPT VISIT HI MDM: CPT | Mod: 25

## 2022-10-14 PROCEDURE — 84484 ASSAY OF TROPONIN QUANT: CPT | Performed by: EMERGENCY MEDICINE

## 2022-10-14 PROCEDURE — 36415 COLL VENOUS BLD VENIPUNCTURE: CPT | Mod: 59 | Performed by: EMERGENCY MEDICINE

## 2022-10-14 ASSESSMENT — ENCOUNTER SYMPTOMS
COUGH: 0
PALPITATIONS: 1
EYE REDNESS: 0
SHORTNESS OF BREATH: 0
VOMITING: 0
FEVER: 0

## 2022-10-14 NOTE — ED TRIAGE NOTES
whooshing in her chest started in end of September, she now is coughing, she has no pain in her chest.  She has referral for pulmonology but her appointment was cancelled. She is post covid positive 2.5 month (July 29)     Triage Assessment     Row Name 10/14/22 [id number]       Triage Assessment (Adult)    Airway WDL WDL       Respiratory WDL    Respiratory WDL WDL       Skin Circulation/Temperature WDL    Skin Circulation/Temperature WDL WDL       Cardiac WDL    Cardiac WDL X  patient hears a wooshing sound coming from her chest       Cognitive/Neuro/Behavioral WDL    Cognitive/Neuro/Behavioral WDL WDL

## 2022-10-14 NOTE — ED PROVIDER NOTES
History     Chief Complaint:  wooshing sound in her heart       HPI   Jackeline Yeboah is a 42 year old female who presents with a swishing sound in both ears.  This has been present for 2 to 3 weeks.  She has had occasional palpitations as well for even a few weeks before that.  No chest pain.  She had COVID in July.  She took Paxlovid and felt better.  Since then she has continued to have an occasional cough.  She has an appointment with pulmonology in November.  She denies any chest pain, headache or recent trauma.  No manipulation by chiropractor.  Specifically no neck pain or injury at all to the neck.  No URI symptoms.  No ear pain.  She is on birth control for PCOS and also has a diagnosis of fibromyalgia.    ROS:  Review of Systems   Constitutional: Negative for fever.   Eyes: Negative for redness.   Respiratory: Negative for cough and shortness of breath.    Cardiovascular: Positive for palpitations. Negative for chest pain and leg swelling.   Gastrointestinal: Negative for vomiting.   Skin: Negative for rash.   All other systems reviewed and are negative.      Allergies:  Apple  Corn Syrup [Glucose]  Grass  No Clinical Screening - See Comments  Dairy Products [Milk Protein Extract]  Dust Mite Extract  Trees  Dogs  Latex  Peanuts [Nuts]     Medications:    ACETAMINOPHEN PO  albuterol (PROAIR HFA/PROVENTIL HFA/VENTOLIN HFA) 108 (90 Base) MCG/ACT inhaler  desvenlafaxine (PRISTIQ) 50 MG 24 hr tablet  Elastic Bandages & Supports (BAND-IT FOREARM BAND) MISC  EPINEPHrine (ADRENACLICK JR) 0.15 MG/0.15ML injection 2-pack  IBUPROFEN PO  loratadine (CLARITIN) 10 MG tablet  LORYNA 3-0.02 MG tablet  melatonin 3 MG tablet  Naproxen Sodium (ALEVE PO)  SUMAtriptan (IMITREX) 100 MG tablet  tiZANidine (ZANAFLEX) 4 MG tablet        Past Medical History:    Past Medical History:   Diagnosis Date     Allergic rhinitis      Anxiety      Depressive disorder      YONATAN (obstructive sleep apnea)      Pituitary adenoma (H)       Uncomplicated asthma        Past Surgical History:    Past Surgical History:   Procedure Laterality Date     ZZC LAP OVARIAN CYSTOTOMY  2007        Family History:    family history includes Asthma in her brother, father, and mother; Depression in her mother; Diabetes (age of onset: 40) in her mother; Hyperlipidemia in her father and mother; Hypertension in her father and mother; Obesity (age of onset: 40) in her mother; Prostate Cancer (age of onset: 50) in her maternal grandmother; Prostate Cancer (age of onset: 65) in her paternal grandmother.    Social History:   reports that she has never smoked. She has never used smokeless tobacco. She reports that she does not drink alcohol and does not use drugs.  PCP: Anjana Peng     Physical Exam     Patient Vitals for the past 24 hrs:   BP Temp Temp src Pulse Resp SpO2 Weight   10/14/22 1738 (!) 154/97 98.2  F (36.8  C) Temporal 112 12 98 % 94.3 kg (208 lb)        Physical Exam  Physical Exam   General:  Sitting on bed by self.   HENT:  No obvious trauma to head  Right Ear:  External ear normal. Tympanic membrane is without erythema or bulging.  Left Ear:  External ear normal. Tympanic membrane is without erythema or bulging.  Nose:  Nose normal.   Eyes:  Conjunctivae and EOM are normal. Pupils are equal, round, and reactive.   Neck: Normal range of motion. Neck supple. No tracheal deviation present.   CV:  Normal heart sounds. No murmur heard.  Pulm/Chest: Effort normal and breath sounds normal.   Abd: Soft. No distension. There is no tenderness. There is no rigidity, no rebound and no guarding.   M/S: Normal range of motion.   Neuro: Alert. GCS 15.  Cranial nerves II to XII grossly intact.  No focal weakness  Skin: Skin is warm and dry. No rash noted. Not diaphoretic.   Psych: Normal mood and affect. Behavior is normal.     Emergency Department Course   ECG:  Indication palpitations  Obtained at 1759 and interpreted at 1800  Nonspecific ST changes  Ventricular  rate 103 bpm  IA interval 170 MS  QRS duration 70 MS  QT/QTc 344/450 MS  PRT axis 43 -13 14    Imaging:  XR Chest 2 Views   Final Result   IMPRESSION: Negative chest.         Report per radiology    Laboratory:  Labs Ordered and Resulted from Time of ED Arrival to Time of ED Departure   BASIC METABOLIC PANEL - Abnormal       Result Value    Sodium 138      Potassium 3.6      Chloride 107      Carbon Dioxide (CO2) 24      Anion Gap 7      Urea Nitrogen 11      Creatinine 0.53      Calcium 8.4 (*)     Glucose 135 (*)     GFR Estimate >90     D DIMER QUANTITATIVE - Normal    D-Dimer Quantitative 0.39     TROPONIN I - Normal    Troponin I High Sensitivity 6     CBC WITH PLATELETS AND DIFFERENTIAL    WBC Count 8.5      RBC Count 5.19      Hemoglobin 14.2      Hematocrit 43.1      MCV 83      MCH 27.4      MCHC 32.9      RDW 13.1      Platelet Count 308      % Neutrophils 47      % Lymphocytes 42      % Monocytes 9      % Eosinophils 1      % Basophils 1      % Immature Granulocytes 0      NRBCs per 100 WBC 0      Absolute Neutrophils 3.9      Absolute Lymphocytes 3.6      Absolute Monocytes 0.8      Absolute Eosinophils 0.1      Absolute Basophils 0.1      Absolute Immature Granulocytes 0.0      Absolute NRBCs 0.0            Emergency Department Course:  Reviewed:  I reviewed nursing notes, vitals and past medical history    Assessments:  1750 I obtained history and examined the patient as noted above.   1950 I rechecked the patient and explained findings.   2011 Updated patient.    Disposition:  The patient was discharged to home.     Impression & Plan    Medical Decision Making:  Jackeline Yeboah is a very pleasant 42 year old year old patient who presents to the emergency department with multiple concerns as above.  The patient has had concerns of palpitations along with a swishing sound in her ears.  This has been present for several weeks.  No evidence of otitis media. The EKG was reviewed and shows no evidence of  Brugada syndrome, Harris-Parkinson-White, hypertrophic cardiomyopathy or prolonged QTc syndrome.  Labs are unremarkable.  She denies any chance of pregnancy.  She is on hormones secondary to PCOS.  D-dimer negative; therefore, I doubt pulmonary. The chest xray was reviewed and shows no evidence of pneumothorax, infiltrate to suggest pneumonia, widened mediastinum to suggest aortic dissection, obvious rib fracture or free air under the diaphragm to suggest perforated viscous ulcer.  I discussed her swishing sound may be related to tinnitus.  No head trauma to suggest vertebral artery dissection.  No focal neurologic deficits to warrant need for imaging of the brain at this time.  The patient was reassured by this and I recommended close follow-up with her PCP.  We discussed reasons to return.    The treatment plan was discussed with the patient and they expressed understanding of this plan and consented to the plan.  In addition, the patient will return to the emergency department if their symptoms persist, worsen, if new symptoms arise or if there is any concern as other pathology may be present that is not evident at this time. They also understand the importance of close follow up in the clinic and if unable to do so will return to the emergency department for a reevaluation. All questions were answered.    Diagnosis:    ICD-10-CM    1. Palpitations  R00.2       2. Tinnitus, bilateral  H93.13            Discharge Medications:  New Prescriptions    No medications on file        10/14/2022   Quang Salomon, Quang Skinner,   10/14/22 2023

## 2023-01-03 ENCOUNTER — TRANSCRIBE ORDERS (OUTPATIENT)
Dept: OTHER | Age: 43
End: 2023-01-03

## 2023-01-03 DIAGNOSIS — M75.81 RIGHT ROTATOR CUFF TENDONITIS: Primary | ICD-10-CM

## 2023-01-11 ENCOUNTER — THERAPY VISIT (OUTPATIENT)
Dept: PHYSICAL THERAPY | Facility: CLINIC | Age: 43
End: 2023-01-11
Attending: PHYSICIAN ASSISTANT
Payer: COMMERCIAL

## 2023-01-11 DIAGNOSIS — M25.511 PAIN IN JOINT OF RIGHT SHOULDER: ICD-10-CM

## 2023-01-11 PROCEDURE — 97161 PT EVAL LOW COMPLEX 20 MIN: CPT | Mod: GP | Performed by: PHYSICAL THERAPIST

## 2023-01-11 PROCEDURE — 97110 THERAPEUTIC EXERCISES: CPT | Mod: GP | Performed by: PHYSICAL THERAPIST

## 2023-01-11 NOTE — PROGRESS NOTES
[facility]    OUTPATIENT Physical Therapy ORTHOPEDIC EVALUATION  PLAN OF TREATMENT FOR OUTPATIENT REHABILITATION  (COMPLETE FOR INITIAL CLAIMS ONLY)  Patient's Last Name, First Name, M.I.  YOB: 1980  Jackeline Yeboah    Provider s Name:  [facility]   Medical Record No.  [id number]   Start of Care Date:  01/11/23   Onset Date:   12/22/22   Treatment Diagnosis:  R shoulder pain/RC tendinitis Medical Diagnosis:  Pain in joint of right shoulder       Goals:     01/11/23 0500   Body Part   Goals listed below are for R shoulder pain   Goal #1   Goal #1 lifting/carrying   Previous Functional Level No restrictions   Current Functional Level Cannot lift;an item overhead weighing   Performance level 3 lbs 3/10 pain   STG Target Performance Lift an item to upper cupboard height weighing   Performance level 3 lbs 0/10 pain   Rationale for housework such as laundry, emptying garbage, use of    Due date 02/05/23   Goal #2   Goal #2 sleeping   Previous Functional Level No restrictions   Current Functional Level Minutes pt is able to lie on affected side   Performance Level unable as too painful   LTG Target Performance Minutes pt able to lie on affected side   Performance level unrestricted 0/10 pain   Rationale to establish restorative sleep pattern   Due Date 03/02/23       Therapy Frequency:  1 x a week  Predicted Duration of Therapy Intervention:  3 weeks tapering to 2 times a month x 2 months    Rich Otero, ANJUM                 I CERTIFY THE NEED FOR THESE SERVICES FURNISHED UNDER                    THIS PLAN OF TREATMENT AND WHILE UNDER MY CARE .             Physician Signature                                       Date    X_____________________________________________________                  Certification Date From:  01/11/23   Certification Date To:   04/10/23    Referring Provider:  Brett Osgood    Initial Assessment        See Epic Evaluation SOC Date: 01/11/23

## 2023-01-11 NOTE — PROGRESS NOTES
Physical Therapy Initial Evaluation  Subjective:  The history is provided by the patient.   Patient Health History  Jackeline Yeboah being seen for Rotator cuff.     Problem began: 12/22/2022.   Problem occurred: repetitive motion/stress   Pain is reported as 3/10 on pain scale.  General health as reported by patient is fair.       Medical allergies: latex and adhesives.       Current medications:  Anti-depressants, anti-inflammatory, hormone replacement therapy, pain medication and sleep medication.                         Therapist Generated HPI Evaluation  Problem details: Pt complains of R shoulder pain starting in Dec. 22, 2022 after doing some repetitive motion working with fabric.  She also recalls in Oct 2022 from ladder and caught herself hanging with her R arm.  She had xrays to R shoulder at [facility] on 12/22/2022 and reports normal xrays but was told RC was angry.  Was given option for cortisone injection but opted to wait and do PT first.  She did go to a PT clinic last week but was not happy with her experience as it caused increased R shoulder pain. .         Type of problem:  Right shoulder.    This is a new condition.  Condition occurred with:  Repetition/overuse and a fall.  Where condition occurred: at home.  Patient reports pain:  Anterior and in the joint.  Pain is described as aching and burning and is intermittent.  Pain radiates to:  Shoulder and lower arm. Pain is the same all the time.  Since onset symptoms are rapidly improving.  Associated symptoms:  Loss of motion/stiffness, tingling and numbness. Symptoms are exacerbated by certain positions and lifting (sleeping is disturbed)  and relieved by heat (massage).  Special tests included:  X-ray.  Previous treatment includes physical therapy (tried PT last week at another location times 1 visit and was too sore so did not return (was given pushups and reaching behind back)).                           Objective:  Standing Alignment:       Shoulder/UE:  Rounded shoulders                  Flexibility/Screens:   Positive screens:  Shoulder  Upper Extremity:        Decreased right upper extremity flexibility present at:  Pectoralis Major and Pectoralis Minor                           Shoulder Evaluation:  ROM:  AROM:    Flexion:  Left:  170    Right:  165+ pain at 90 degrees    Abduction:  Left: WNL   Right:  WNL with pain      External Rotation:  Left:  85    Right:  85+            Extension/Internal Rotation:  Left:  T8    Right:  L1+    PROM:    Flexion:  Left:  WNL    Right: WNL min pain endrange      Abduction:  Left:  WNL    Right:  WNL, min pain endrange    Internal Rotation:  Left:  WNL    Right:  WNL  External Rotation:  Left:  WNL    Right:  WNL                    Strength:    Flexion: Left:5/5   Pain:    Right: 4+/5  Strong/painful     Pain:   Extension:  Left: 5/5    Pain:    Right: 5/5    Pain:  Abduction:  Left: 5/5  Pain:    Right: 4/5   Strong/painful    Pain:  Adduction:  Left: 5/5    Pain:    Right: 5/5     Pain:  Internal Rotation:  Left:4+/5     Pain:    Right: 4+/5     Pain:  External Rotation:   Left:4+/5     Pain:   Right:4+/5     Pain:        Elbow Flexion:  Left:4+/5     Pain:    Right:4+/5     Pain:  Elbow Extension:  Left:4+/5     Pain:    Right:4+/5     Pain:    Special Tests:  Special tests assessed shoulder: did have pain with lift off test (hand behind back) on R.  Negative empty can and negative tummy test.      Right shoulder negative for the following special tests:Impingement and Rotator cuff tear  Palpation:  Palpation assessed shoulder: tender R pect.    Right shoulder tenderness present at: Supraspinatus and Upper Trap  Mobility Tests:      Glenohumeral posterior right:  Hypomobile                                             General     ROS    Assessment/Plan:    Patient is a 42 year old female with right side shoulder complaints.    Patient has the following significant findings with corresponding treatment  plan.                Diagnosis 1:  R shoulder tendinitis  Pain -  manual therapy, self management, education and home program  Decreased ROM/flexibility - manual therapy, therapeutic exercise, therapeutic activity and home program  Decreased strength - therapeutic exercise, therapeutic activities and home program  Impaired muscle performance - neuro re-education and home program  Decreased function - therapeutic activities and home program    Therapy Evaluation Codes:   1) History comprised of:   Personal factors that impact the plan of care:      None.    Comorbidity factors that impact the plan of care are:      None.     Medications impacting care: None.  2) Examination of Body Systems comprised of:   Body structures and functions that impact the plan of care:      Shoulder.   Activity limitations that impact the plan of care are:      Dressing, Lifting and Sleeping.  3) Clinical presentation characteristics are:   Stable/Uncomplicated.  4) Decision-Making    Low complexity using standardized patient assessment instrument and/or measureable assessment of functional outcome.  Cumulative Therapy Evaluation is: Low complexity.    Previous and current functional limitations:  (See Goal Flow Sheet for this information)    Short term and Long term goals: (See Goal Flow Sheet for this information)     Communication ability:  Patient appears to be able to clearly communicate and understand verbal and written communication and follow directions correctly.  Treatment Explanation - The following has been discussed with the patient:   RX ordered/plan of care  Anticipated outcomes  Possible risks and side effects  This patient would benefit from PT intervention to resume normal activities.   Rehab potential is good.    Frequency:  1 X week, once daily  Duration:  for 3 weeks tapering to 2 X a month over 2 months  Discharge Plan:  Achieve all LTG.  Independent in home treatment program.  Reach maximal therapeutic  benefit.    Please refer to the daily flowsheet for treatment today, total treatment time and time spent performing 1:1 timed codes.

## 2023-01-18 ENCOUNTER — THERAPY VISIT (OUTPATIENT)
Dept: PHYSICAL THERAPY | Facility: CLINIC | Age: 43
End: 2023-01-18
Payer: COMMERCIAL

## 2023-01-18 DIAGNOSIS — M25.511 PAIN IN JOINT OF RIGHT SHOULDER: Primary | ICD-10-CM

## 2023-01-18 PROCEDURE — 97110 THERAPEUTIC EXERCISES: CPT | Mod: GP | Performed by: PHYSICAL THERAPIST

## 2023-01-18 PROCEDURE — 97140 MANUAL THERAPY 1/> REGIONS: CPT | Mod: GP | Performed by: PHYSICAL THERAPIST

## 2023-01-25 ENCOUNTER — THERAPY VISIT (OUTPATIENT)
Dept: PHYSICAL THERAPY | Facility: CLINIC | Age: 43
End: 2023-01-25
Payer: COMMERCIAL

## 2023-01-25 DIAGNOSIS — M25.511 PAIN IN JOINT OF RIGHT SHOULDER: Primary | ICD-10-CM

## 2023-01-25 PROCEDURE — 97110 THERAPEUTIC EXERCISES: CPT | Mod: GP | Performed by: PHYSICAL THERAPIST

## 2023-04-30 ENCOUNTER — HEALTH MAINTENANCE LETTER (OUTPATIENT)
Age: 43
End: 2023-04-30

## 2024-02-18 ENCOUNTER — HEALTH MAINTENANCE LETTER (OUTPATIENT)
Age: 44
End: 2024-02-18

## 2024-07-07 ENCOUNTER — HEALTH MAINTENANCE LETTER (OUTPATIENT)
Age: 44
End: 2024-07-07

## 2024-08-24 ENCOUNTER — OFFICE VISIT (OUTPATIENT)
Dept: URGENT CARE | Facility: URGENT CARE | Age: 44
End: 2024-08-24
Payer: COMMERCIAL

## 2024-08-24 VITALS
HEART RATE: 78 BPM | OXYGEN SATURATION: 95 % | TEMPERATURE: 98 F | DIASTOLIC BLOOD PRESSURE: 90 MMHG | SYSTOLIC BLOOD PRESSURE: 136 MMHG | RESPIRATION RATE: 18 BRPM

## 2024-08-24 DIAGNOSIS — Z20.822 EXPOSURE TO 2019 NOVEL CORONAVIRUS: Primary | ICD-10-CM

## 2024-08-24 LAB
DEPRECATED S PYO AG THROAT QL EIA: NEGATIVE
FLUAV RNA SPEC QL NAA+PROBE: NEGATIVE
FLUBV RNA RESP QL NAA+PROBE: NEGATIVE
GROUP A STREP BY PCR: NOT DETECTED
RSV RNA SPEC NAA+PROBE: NEGATIVE
SARS-COV-2 RNA RESP QL NAA+PROBE: NEGATIVE

## 2024-08-24 PROCEDURE — 87651 STREP A DNA AMP PROBE: CPT | Performed by: FAMILY MEDICINE

## 2024-08-24 PROCEDURE — 87637 SARSCOV2&INF A&B&RSV AMP PRB: CPT | Performed by: FAMILY MEDICINE

## 2024-08-24 PROCEDURE — 99213 OFFICE O/P EST LOW 20 MIN: CPT | Performed by: FAMILY MEDICINE

## 2024-08-24 RX ORDER — GABAPENTIN 400 MG/1
1 CAPSULE ORAL AT BEDTIME
COMMUNITY
Start: 2024-01-01

## 2024-08-24 RX ORDER — BUDESONIDE AND FORMOTEROL FUMARATE DIHYDRATE 160; 4.5 UG/1; UG/1
AEROSOL RESPIRATORY (INHALATION)
COMMUNITY

## 2024-08-24 RX ORDER — GABAPENTIN 100 MG/1
CAPSULE ORAL
COMMUNITY
Start: 2024-03-21

## 2024-08-24 NOTE — PROGRESS NOTES
Assessment & Plan     Exposure to 2019 novel coronavirus    - Symptomatic Influenza A/B, RSV, & SARS-CoV2 PCR (COVID-19) Nose  - Streptococcus A Rapid Screen w/Reflex to PCR - Clinic Collect  - Group A Streptococcus PCR Throat Swab    COVID pending.  Strep negative.  Recommend rest, fluids and Tylenol/ibuprofen prn fever.  Close Follow-up if any new or worsening sx prn.  Advised if COVID is + tomorrow- to [facility] for virtual appt for Paxlovid prn.    Karen Viveros is a 44 year old, presenting for the following health issues:  Urgent Care (Pt states her house mate tested positive on Sunday and is wanting testing to know if she also is positive. Pt has been experiencing coughing, sneezing, fever, and headache.  )    HPI     Exposed to COVID from roommate who tested + on Sunday.  Then was with group of kids on Monday.  Now with increased cough, sneezing, fever and HA in past days.  Would like a COVID and strep test today.    Review of Systems  Constitutional, neuro, ENT, endocrine, pulmonary, cardiac, gastrointestinal, genitourinary, musculoskeletal, integument and psychiatric systems are negative, except as otherwise noted.      Objective    BP (!) 136/90   Pulse 78   Temp 98  F (36.7  C) (Oral)   Resp 18   SpO2 95%   There is no height or weight on file to calculate BMI.  Physical Exam   GENERAL: alert and no distress  EYES: Eyes grossly normal to inspection, PERRL and conjunctivae and sclerae normal  HENT: nose and mouth without ulcers or lesions  RESP: lungs clear to auscultation - no rales, rhonchi or wheezes  CV: regular rate and rhythm, normal S1 S2, no S3 or S4, no murmur, click or rub, no peripheral edema   PSYCH: mentation appears normal, affect normal/bright    Results for orders placed or performed in visit on 08/24/24 (from the past 24 hour(s))   Streptococcus A Rapid Screen w/Reflex to PCR - Clinic Collect    Specimen: Throat; Swab   Result Value Ref Range    Group A Strep antigen Negative  Negative           Signed Electronically by: Georgie Reveles MD

## 2024-08-24 NOTE — PATIENT INSTRUCTIONS
If you are positive (results will come in tomorrow) please call [phone] to schedule a virtual appt for Paxlovid if desired to treat COVID.    Please avoid coming in to the Urgent Care and exposing other people.

## 2025-05-08 NOTE — PATIENT INSTRUCTIONS
Your BMI is Body mass index is 36.9 kg/m .  Weight management is a personal decision.  If you are interested in exploring weight loss strategies, the following discussion covers the approaches that may be successful. Body mass index (BMI) is one way to tell whether you are at a healthy weight, overweight, or obese. It measures your weight in relation to your height.  A BMI of 18.5 to 24.9 is in the healthy range. A person with a BMI of 25 to 29.9 is considered overweight, and someone with a BMI of 30 or greater is considered obese. More than two-thirds of American adults are considered overweight or obese.  Being overweight or obese increases the risk for further weight gain. Excess weight may lead to heart disease and diabetes.  Creating and following plans for healthy eating and physical activity may help you improve your health.  Weight control is part of healthy lifestyle and includes exercise, emotional health, and healthy eating habits. Careful eating habits lifelong are the mainstay of weight control. Though there are significant health benefits from weight loss, long-term weight loss with diet alone may be very difficult to achieve- studies show long-term success with dietary management in less than 10% of people. Attaining a healthy weight may be especially difficult to achieve in those with severe obesity. In some cases, medications, devices and surgical management might be considered.  What can you do?  If you are overweight or obese and are interested in methods for weight loss, you should discuss this with your provider.     Consider reducing daily calorie intake by 500 calories.     Keep a food journal.     Avoiding skipping meals, consider cutting portions instead.    Diet combined with exercise helps maintain muscle while optimizing fat loss. Strength training is particularly important for building and maintaining muscle mass. Exercise helps reduce stress, increase energy, and improves fitness.  Eli Cobos  tolerated treatment well with no complications.      Eli Cobos is aware of future appt on 5/13 at 0730.     AVS printed and given to Eli Cobos:  No (Declined by Eli Cobos)        Increasing exercise without diet control, however, may not burn enough calories to loose weight.       Start walking three days a week 10-20 minutes at a time    Work towards walking thirty minutes five days a week     Eventually, increase the speed of your walking for 1-2 minutes at time    In addition, we recommend that you review healthy lifestyles and methods for weight loss available through the National Institutes of Health patient information sites:  http://win.niddk.nih.gov/publications/index.htm    And look into health and wellness programs that may be available through your health insurance provider, employer, local community center, or [employer].    Weight management plan: Patient was referred to their PCP to discuss a diet and exercise plan.

## 2025-05-19 ENCOUNTER — APPOINTMENT (OUTPATIENT)
Dept: CT IMAGING | Facility: CLINIC | Age: 45
End: 2025-05-19
Attending: STUDENT IN AN ORGANIZED HEALTH CARE EDUCATION/TRAINING PROGRAM
Payer: COMMERCIAL

## 2025-05-19 ENCOUNTER — HOSPITAL ENCOUNTER (EMERGENCY)
Facility: CLINIC | Age: 45
Discharge: HOME | End: 2025-05-19
Attending: STUDENT IN AN ORGANIZED HEALTH CARE EDUCATION/TRAINING PROGRAM | Admitting: STUDENT IN AN ORGANIZED HEALTH CARE EDUCATION/TRAINING PROGRAM
Payer: COMMERCIAL

## 2025-05-19 VITALS
DIASTOLIC BLOOD PRESSURE: 101 MMHG | RESPIRATION RATE: 18 BRPM | SYSTOLIC BLOOD PRESSURE: 150 MMHG | HEART RATE: 80 BPM | WEIGHT: 220 LBS | BODY MASS INDEX: 37.76 KG/M2 | TEMPERATURE: 98 F | OXYGEN SATURATION: 99 %

## 2025-05-19 DIAGNOSIS — N85.8 UTERINE MASS: ICD-10-CM

## 2025-05-19 DIAGNOSIS — R10.9 LEFT FLANK PAIN: ICD-10-CM

## 2025-05-19 DIAGNOSIS — N28.89 RENAL CALCIFICATION: ICD-10-CM

## 2025-05-19 LAB
ALBUMIN UR-MCNC: 20 MG/DL
ALBUMIN UR-MCNC: NEGATIVE MG/DL
ANION GAP SERPL CALCULATED.3IONS-SCNC: 13 MMOL/L (ref 7–15)
APPEARANCE UR: CLEAR
APPEARANCE UR: CLEAR
BACTERIA #/AREA URNS HPF: ABNORMAL /HPF
BASOPHILS # BLD AUTO: 0.1 10E3/UL (ref 0–0.2)
BASOPHILS NFR BLD AUTO: 1 %
BILIRUB UR QL STRIP: NEGATIVE
BILIRUB UR QL STRIP: NEGATIVE
BUN SERPL-MCNC: 9.5 MG/DL (ref 6–20)
CALCIUM SERPL-MCNC: 8.8 MG/DL (ref 8.8–10.4)
CHLORIDE SERPL-SCNC: 104 MMOL/L (ref 98–107)
COLOR UR AUTO: ABNORMAL
COLOR UR AUTO: ABNORMAL
CREAT SERPL-MCNC: 0.68 MG/DL (ref 0.51–0.95)
EGFRCR SERPLBLD CKD-EPI 2021: >90 ML/MIN/1.73M2
EOSINOPHIL # BLD AUTO: 0 10E3/UL (ref 0–0.7)
EOSINOPHIL NFR BLD AUTO: 0 %
ERYTHROCYTE [DISTWIDTH] IN BLOOD BY AUTOMATED COUNT: 12.7 % (ref 10–15)
GLUCOSE SERPL-MCNC: 99 MG/DL (ref 70–99)
GLUCOSE UR STRIP-MCNC: NEGATIVE MG/DL
GLUCOSE UR STRIP-MCNC: NEGATIVE MG/DL
HCG SERPL QL: NEGATIVE
HCO3 SERPL-SCNC: 21 MMOL/L (ref 22–29)
HCT VFR BLD AUTO: 42.1 % (ref 35–47)
HGB BLD-MCNC: 14.1 G/DL (ref 11.7–15.7)
HGB UR QL STRIP: ABNORMAL
HGB UR QL STRIP: ABNORMAL
IMM GRANULOCYTES # BLD: 0 10E3/UL
IMM GRANULOCYTES NFR BLD: 0 %
KETONES UR STRIP-MCNC: NEGATIVE MG/DL
KETONES UR STRIP-MCNC: NEGATIVE MG/DL
LEUKOCYTE ESTERASE UR QL STRIP: ABNORMAL
LEUKOCYTE ESTERASE UR QL STRIP: NEGATIVE
LYMPHOCYTES # BLD AUTO: 2.6 10E3/UL (ref 0.8–5.3)
LYMPHOCYTES NFR BLD AUTO: 27 %
MCH RBC QN AUTO: 27.3 PG (ref 26.5–33)
MCHC RBC AUTO-ENTMCNC: 33.5 G/DL (ref 31.5–36.5)
MCV RBC AUTO: 82 FL (ref 78–100)
MONOCYTES # BLD AUTO: 0.7 10E3/UL (ref 0–1.3)
MONOCYTES NFR BLD AUTO: 7 %
MUCOUS THREADS #/AREA URNS LPF: PRESENT /LPF
NEUTROPHILS # BLD AUTO: 6.1 10E3/UL (ref 1.6–8.3)
NEUTROPHILS NFR BLD AUTO: 64 %
NITRATE UR QL: NEGATIVE
NITRATE UR QL: NEGATIVE
NRBC # BLD AUTO: 0 10E3/UL
NRBC BLD AUTO-RTO: 0 /100
PH UR STRIP: 6.5 [PH] (ref 5–7)
PH UR STRIP: 6.5 [PH] (ref 5–7)
PLATELET # BLD AUTO: 431 10E3/UL (ref 150–450)
POTASSIUM SERPL-SCNC: 4 MMOL/L (ref 3.4–5.3)
RBC # BLD AUTO: 5.16 10E6/UL (ref 3.8–5.2)
RBC URINE: 22 /HPF
RBC URINE: 3 /HPF
SODIUM SERPL-SCNC: 138 MMOL/L (ref 135–145)
SP GR UR STRIP: 1.02 (ref 1–1.03)
SP GR UR STRIP: 1.02 (ref 1–1.03)
SQUAMOUS EPITHELIAL: 16 /HPF
UROBILINOGEN UR STRIP-MCNC: NORMAL MG/DL
UROBILINOGEN UR STRIP-MCNC: NORMAL MG/DL
WBC # BLD AUTO: 9.4 10E3/UL (ref 4–11)
WBC URINE: 12 /HPF
WBC URINE: <1 /HPF

## 2025-05-19 PROCEDURE — 81003 URINALYSIS AUTO W/O SCOPE: CPT | Performed by: EMERGENCY MEDICINE

## 2025-05-19 PROCEDURE — 84703 CHORIONIC GONADOTROPIN ASSAY: CPT | Performed by: STUDENT IN AN ORGANIZED HEALTH CARE EDUCATION/TRAINING PROGRAM

## 2025-05-19 PROCEDURE — 81003 URINALYSIS AUTO W/O SCOPE: CPT | Performed by: STUDENT IN AN ORGANIZED HEALTH CARE EDUCATION/TRAINING PROGRAM

## 2025-05-19 PROCEDURE — 36415 COLL VENOUS BLD VENIPUNCTURE: CPT | Performed by: STUDENT IN AN ORGANIZED HEALTH CARE EDUCATION/TRAINING PROGRAM

## 2025-05-19 PROCEDURE — 250N000011 HC RX IP 250 OP 636: Mod: JZ | Performed by: STUDENT IN AN ORGANIZED HEALTH CARE EDUCATION/TRAINING PROGRAM

## 2025-05-19 PROCEDURE — 258N000003 HC RX IP 258 OP 636: Performed by: STUDENT IN AN ORGANIZED HEALTH CARE EDUCATION/TRAINING PROGRAM

## 2025-05-19 PROCEDURE — 99285 EMERGENCY DEPT VISIT HI MDM: CPT | Mod: 25

## 2025-05-19 PROCEDURE — 87086 URINE CULTURE/COLONY COUNT: CPT | Performed by: STUDENT IN AN ORGANIZED HEALTH CARE EDUCATION/TRAINING PROGRAM

## 2025-05-19 PROCEDURE — 96361 HYDRATE IV INFUSION ADD-ON: CPT

## 2025-05-19 PROCEDURE — 80048 BASIC METABOLIC PNL TOTAL CA: CPT | Performed by: STUDENT IN AN ORGANIZED HEALTH CARE EDUCATION/TRAINING PROGRAM

## 2025-05-19 PROCEDURE — 96374 THER/PROPH/DIAG INJ IV PUSH: CPT

## 2025-05-19 PROCEDURE — 74176 CT ABD & PELVIS W/O CONTRAST: CPT

## 2025-05-19 PROCEDURE — 250N000013 HC RX MED GY IP 250 OP 250 PS 637: Performed by: STUDENT IN AN ORGANIZED HEALTH CARE EDUCATION/TRAINING PROGRAM

## 2025-05-19 PROCEDURE — 85004 AUTOMATED DIFF WBC COUNT: CPT | Performed by: STUDENT IN AN ORGANIZED HEALTH CARE EDUCATION/TRAINING PROGRAM

## 2025-05-19 RX ORDER — KETOROLAC TROMETHAMINE 15 MG/ML
15 INJECTION, SOLUTION INTRAMUSCULAR; INTRAVENOUS ONCE
Status: COMPLETED | OUTPATIENT
Start: 2025-05-19 | End: 2025-05-19

## 2025-05-19 RX ORDER — ACETAMINOPHEN 500 MG
1000 TABLET ORAL ONCE
Status: COMPLETED | OUTPATIENT
Start: 2025-05-19 | End: 2025-05-19

## 2025-05-19 RX ADMIN — KETOROLAC TROMETHAMINE 15 MG: 15 INJECTION, SOLUTION INTRAMUSCULAR; INTRAVENOUS at 16:11

## 2025-05-19 RX ADMIN — ACETAMINOPHEN 1000 MG: 500 TABLET ORAL at 16:12

## 2025-05-19 RX ADMIN — SODIUM CHLORIDE 1000 ML: 0.9 INJECTION, SOLUTION INTRAVENOUS at 16:12

## 2025-05-19 ASSESSMENT — ACTIVITIES OF DAILY LIVING (ADL)
ADLS_ACUITY_SCORE: 41

## 2025-05-19 ASSESSMENT — COLUMBIA-SUICIDE SEVERITY RATING SCALE - C-SSRS
2. HAVE YOU ACTUALLY HAD ANY THOUGHTS OF KILLING YOURSELF IN THE PAST MONTH?: NO
6. HAVE YOU EVER DONE ANYTHING, STARTED TO DO ANYTHING, OR PREPARED TO DO ANYTHING TO END YOUR LIFE?: NO
1. IN THE PAST MONTH, HAVE YOU WISHED YOU WERE DEAD OR WISHED YOU COULD GO TO SLEEP AND NOT WAKE UP?: NO

## 2025-05-19 NOTE — ED PROVIDER NOTES
Emergency Department Note      History of Present Illness     Chief Complaint   Flank Pain      HPI   Jackeline Yeboah is a 45 year old female with a history of  hypothyroidism who presents for an evaluation of flank pain. The patient reports experiencing constant left flank pain for the past week. She remarks that said pain does not radiate anywhere, worsens with movement throughout the day, and is a more deep pain as a opposed to a muscular pain. Additionally endorses experiencing a subjective fever for the past 2 days. Indicates that she has taken omeprazole and pain medication without relief. Notes that she has had a UTI in the past, and that her current symptoms do not feel similar to what she experienced during said UTI. Currently characterizes the severity of her pain at a 7 - 8 / 10. Denies experiencing nausea, vomiting, urinary symptoms, difficulty breathing, sore throat, rhinorrhea, cough, bowel symptoms, or recent flank trauma.  Was briefly constipated, but that resolved.  Sitting still she actually feels quite well then she gets up and moves and hurts.    Independent Historian   None    Review of External Notes   none    Past Medical History     Medical History and Problem List   Fibromyalgia  Periodic limb movement disorder  Hypothyroidism  Persistent depressive disorder  YONATAN on CPAP  Pituitary adenoma    Medications   Epinephrine    Surgical History   GI EGD with biopsy  [place] teeth extraction  Ovarian cyst removal    Physical Exam     Patient Vitals for the past 24 hrs:   BP Temp Temp src Pulse Resp SpO2 Weight   05/19/25 1924 (!) 150/101 -- -- 80 18 99 % --   05/19/25 1303 (!) 162/103 98  F (36.7  C) Temporal 106 20 98 % 99.8 kg (220 lb)     Physical Exam  GENERAL: Patient well-appearing  HEAD: Atraumatic.  NECK: No rigidity  CV: RRR, no murmurs, rubs or gallops  PULM: CTAB with good aeration; no retractions, rales, rhonchi, or wheezing  ABD: Mild left sided flank tenderness.  Abdomen is otherwise  soft and nondistended and nonrigid and reassuring.  DERM: No rash. Skin warm and dry  EXTREMITY: Moving all extremities without difficulty.     Diagnostics     Lab Results   Labs Ordered and Resulted from Time of ED Arrival to Time of ED Departure   UA MACROSCOPIC WITH REFLEX TO MICRO AND CULTURE - Abnormal       Result Value    Color Urine Light Yellow      Appearance Urine Clear      Glucose Urine Negative      Bilirubin Urine Negative      Ketones Urine Negative      Specific Gravity Urine 1.024      Blood Urine Small (*)     pH Urine 6.5      Protein Albumin Urine 20 (*)     Urobilinogen Urine Normal      Nitrite Urine Negative      Leukocyte Esterase Urine Small (*)     Bacteria Urine Few (*)     Mucus Urine Present (*)     RBC Urine 22 (*)     WBC Urine 12 (*)     Squamous Epithelials Urine 16 (*)    BASIC METABOLIC PANEL - Abnormal    Sodium 138      Potassium 4.0      Chloride 104      Carbon Dioxide (CO2) 21 (*)     Anion Gap 13      Urea Nitrogen 9.5      Creatinine 0.68      GFR Estimate >90      Calcium 8.8      Glucose 99     UA MACROSCOPIC WITH REFLEX TO MICRO AND CULTURE - Abnormal    Color Urine Light Yellow      Appearance Urine Clear      Glucose Urine Negative      Bilirubin Urine Negative      Ketones Urine Negative      Specific Gravity Urine 1.020      Blood Urine Small (*)     pH Urine 6.5      Protein Albumin Urine Negative      Urobilinogen Urine Normal      Nitrite Urine Negative      Leukocyte Esterase Urine Negative      RBC Urine 3 (*)     WBC Urine <1     HCG QUALITATIVE PREGNANCY - Normal    hCG Serum Qualitative Negative     CBC WITH PLATELETS AND DIFFERENTIAL    WBC Count 9.4      RBC Count 5.16      Hemoglobin 14.1      Hematocrit 42.1      MCV 82      MCH 27.3      MCHC 33.5      RDW 12.7      Platelet Count 431      % Neutrophils 64      % Lymphocytes 27      % Monocytes 7      % Eosinophils 0      % Basophils 1      % Immature Granulocytes 0      NRBCs per 100 WBC 0      Absolute  Neutrophils 6.1      Absolute Lymphocytes 2.6      Absolute Monocytes 0.7      Absolute Eosinophils 0.0      Absolute Basophils 0.1      Absolute Immature Granulocytes 0.0      Absolute NRBCs 0.0     URINE CULTURE       Imaging   CT Abdomen Pelvis w/o Contrast   Final Result   IMPRESSION:    1.  Large pelvic masses likely representing exophytic uterine fibroids. MRI recommended for confirmation.   2.  Small calcification left renal cortex, otherwise no evidence for renal calculi. No hydronephrosis.                ED Course      Medications Administered   Medications   sodium chloride 0.9% BOLUS 1,000 mL (0 mLs Intravenous Stopped 5/19/25 1852)   ketorolac (TORADOL) injection 15 mg (15 mg Intravenous $Given 5/19/25 1611)   acetaminophen (TYLENOL) tablet 1,000 mg (1,000 mg Oral $Given 5/19/25 1612)       Procedures   Procedures     Discussion of Management   Discussed with radiologist Dr. Calero.    ED Course   ED Course as of 05/19/25 2000   Mon May 19, 2025   0317 I obtained the history and evaluated the patient as noted above.        Additional Documentation  None    Medical Decision Making / Diagnosis     CMS Diagnoses: None    MIPS   None               MDM   Jackeline Yeboah is a 45 year old female present with 1 week of left-sided flank/left-sided abdominal pain.  She appears very well.  Resting comfortably.  Abdominal assessment is benign.  Only mild left-sided flank/left-sided abdominal tenderness.  Initial UA was contaminated.  Repeat and there is a few red blood cells but no signs of infection.  hCG negative.  CBC and BMP unremarkable.  Ordered CT scan to exclude stone and it shows multiple uterine masses that are likely fibroids.  I discussed this with the radiologist who recommends an outpatient MRI.  Also with a calcification on the kidney which I made the patient aware of.  I gave her fluids, Tylenol, Toradol.  She states it does not really change her pain, but she still does appear very well.  Symptoms  are exacerbated by getting up and moving around.  I wonder if there is a musculoskeletal component.  Ultimately, recommend she follow-up with her doctor for the outpatient MRI to further evaluate these uterine masses.  Although, not certain if that would cause her pain as she points to her left side as where her discomfort is. Clinical picture does not appear consistent with torsion.   I have evaluated the patient for acute medical emergencies and have clinically decided no further acute medical interventions are required. Reassessed multiple times and well appearing. Patient stable for discharge. All questions answered. Given strict return precautions. Patient content with plan. The differential diagnosis and treatment modalities were discussed thoroughly with the patient. Recommended PCP follow-up in 2-3 days.      Disposition   The patient was discharged.     Diagnosis     ICD-10-CM    1. Uterine mass  N85.8       2. Left flank pain  R10.9       3. Renal calcification  N28.89            Discharge Medications   Discharge Medication List as of 5/19/2025  7:19 PM            Scribe Disclosure:  I, Pete Altamirano, am serving as a scribe at 3:51 PM on 5/19/2025 to document services personally performed by Caesar Maciel MD based on my observations and the provider's statements to me.       Caesar Maciel MD  05/19/25 2000  Placed gynecology referral.     Caesar Maciel MD  05/19/25 2002

## 2025-05-19 NOTE — ED TRIAGE NOTES
Left flank pain for one week. No dysuria. No constipation. No N/V. Pain worse with movement. Also had fever last two days.

## 2025-05-20 ENCOUNTER — PATIENT OUTREACH (OUTPATIENT)
Dept: CARE COORDINATION | Facility: CLINIC | Age: 45
End: 2025-05-20
Payer: COMMERCIAL

## 2025-05-20 NOTE — DISCHARGE INSTRUCTIONS
Follow-up with your doctor for an outpatient pelvic MRI of your uterus.  Return to the emergency department if symptoms are worsening, become concerning, or for any other concerns. Follow-up with your doctor in 2-3 days and sooner if needed.

## 2025-05-21 ENCOUNTER — RESULTS FOLLOW-UP (OUTPATIENT)
Dept: NURSING | Facility: CLINIC | Age: 45
End: 2025-05-21

## 2025-05-21 LAB — BACTERIA UR CULT: NORMAL

## 2025-05-22 ENCOUNTER — PATIENT OUTREACH (OUTPATIENT)
Dept: CARE COORDINATION | Facility: CLINIC | Age: 45
End: 2025-05-22
Payer: COMMERCIAL

## 2025-06-07 ENCOUNTER — HEALTH MAINTENANCE LETTER (OUTPATIENT)
Age: 45
End: 2025-06-07